# Patient Record
Sex: MALE | Race: WHITE | Employment: OTHER | URBAN - METROPOLITAN AREA
[De-identification: names, ages, dates, MRNs, and addresses within clinical notes are randomized per-mention and may not be internally consistent; named-entity substitution may affect disease eponyms.]

---

## 2019-01-16 ENCOUNTER — HOSPITAL ENCOUNTER (OUTPATIENT)
Dept: CT IMAGING | Age: 74
Discharge: HOME | End: 2019-01-16
Attending: COLON & RECTAL SURGERY
Payer: MEDICARE

## 2019-01-16 DIAGNOSIS — K91.89 ILEAL POUCH-ANAL ANASTOMOTIC LEAK: ICD-10-CM

## 2019-01-16 LAB — CREAT BLD-MCNC: 1.1 MG/DL (ref 0.6–1.3)

## 2019-01-16 PROCEDURE — 74011250636 HC RX REV CODE- 250/636: Performed by: COLON & RECTAL SURGERY

## 2019-01-16 PROCEDURE — 82565 ASSAY OF CREATININE: CPT

## 2019-01-16 PROCEDURE — 74011636320 HC RX REV CODE- 636/320: Performed by: COLON & RECTAL SURGERY

## 2019-01-16 PROCEDURE — 72193 CT PELVIS W/DYE: CPT

## 2019-01-16 RX ORDER — SODIUM CHLORIDE 0.9 % (FLUSH) 0.9 %
10 SYRINGE (ML) INJECTION
Status: COMPLETED | OUTPATIENT
Start: 2019-01-16 | End: 2019-01-16

## 2019-01-16 RX ORDER — SODIUM CHLORIDE 9 MG/ML
50 INJECTION, SOLUTION INTRAVENOUS
Status: COMPLETED | OUTPATIENT
Start: 2019-01-16 | End: 2019-01-16

## 2019-01-16 RX ADMIN — SODIUM CHLORIDE 50 ML/HR: 900 INJECTION, SOLUTION INTRAVENOUS at 07:07

## 2019-01-16 RX ADMIN — Medication 10 ML: at 07:07

## 2019-01-16 RX ADMIN — IOPAMIDOL 100 ML: 755 INJECTION, SOLUTION INTRAVENOUS at 07:07

## 2019-03-25 RX ORDER — CYANOCOBALAMIN (VITAMIN B-12) 1000 MCG
1 TABLET, EXTENDED RELEASE ORAL DAILY
COMMUNITY

## 2019-03-25 RX ORDER — CHOLECALCIFEROL (VITAMIN D3) 125 MCG
1 CAPSULE ORAL DAILY
COMMUNITY

## 2019-03-25 NOTE — PERIOP NOTES
[facility] Ambulatory Surgery Unit Pre-operative Instructions Surgery/Procedure Date  Friday, March 29, 2019            Tentative Arrival Time 6700 1. On the day of your surgery/procedure, please report to the Ambulatory Surgery Unit Registration Desk and sign in at your designated time. The Ambulatory Surgery Unit is located in [facility] on the [facility] side of the [facility] across from the [address] [place] [address]. Please have all of your health insurance cards and a photo ID. 2. You must have someone with you to drive you home, as you should not drive a car for 24 hours following anesthesia. Please make arrangements for a responsible adult friend or family member to stay with you for at least the first 24 hours after your surgery. 3. Do not have anything to eat or drink (including water, gum, mints, coffee, juice) after 11:59 PM, Thursday. This may not apply to medications prescribed by your physician. (Please note below the special instructions with medications to take the morning of surgery, if applicable.) 4. We recommend you do not drink any alcoholic beverages for 24 hours before and after your surgery. 5. Contact your surgeons office for instructions on the following medications: non-steroidal anti-inflammatory drugs (i.e. Advil, Aleve), vitamins, and supplements. (Some surgeons will want you to stop these medications prior to surgery and others may allow you to take them) **If you are currently taking Plavix, Coumadin, Aspirin and/or other blood-thinning agents, contact your surgeon for instructions. ** Your surgeon will partner with the physician prescribing these medications to determine if it is safe to stop or if you need to continue taking. Please do not stop taking these medications without instructions from your surgeon.  
 
6. In an effort to help prevent surgical site infection, we ask that you shower with an anti-bacterial soap (i.e. Dial/Safeguard, or the soap provided to you at your preadmission testing appointment) for 3 days prior to and on the morning of surgery, using a fresh towel after each shower. (Please begin this process with fresh bed linens.) Do not apply any lotions, powders, or deodorants after the shower on the day of your procedure. If applicable, please do not shave the operative site for 48 hours prior to surgery. 7. Wear comfortable clothes. Wear glasses instead of contacts. Do not bring any jewelry or money (other than copays or fees as instructed). Do not wear make-up, particularly mascara, the morning of your surgery. Do not wear nail polish, particularly if you are having foot /hand surgery. Wear your hair loose or down, no ponytails, buns, anup pins or clips. All body piercings must be removed. 8. You should understand that if you do not follow these instructions your surgery may be cancelled. If your physical condition changes (i.e. fever, cold or flu) please contact your surgeon as soon as possible. 9. It is important that you be on time. If a situation occurs where you may be late, or if you have any questions or problems, please call [phone]. 
 
10. Your surgery time may be subject to change. You will receive a phone call the day prior to surgery to confirm your arrival time. 11. Pediatric patients: please bring a change of clothes, diapers, bottle/sippy cup, pacifier, etc. 
 
 
Special Instructions: Take all medications and inhalers, as prescribed, on the morning of surgery with a sip of water. I understand a pre-operative phone call will be made to verify my surgery time. In the event that I am not available, I give permission for a message to be left on my answering service and/or with another person? yes Preop instructions reviewed  Pt verbalized understanding.  
 
 ___________________      ___________________      ________________ (Signature of Patient)          (Witness)                   (Date and Time)

## 2019-03-26 NOTE — ADVANCED PRACTICE NURSE
MASSIMO 5 in PAT phone assessment. Pt reports loud snoring, but denies witnessed apnea while sleeping or ever having been referred for a sleep apnea evaluation. Results faxed to PCP for further recommendations regarding sleep apnea evaluation. Confirmation of fax received.

## 2019-03-28 ENCOUNTER — ANESTHESIA EVENT (OUTPATIENT)
Dept: SURGERY | Age: 74
End: 2019-03-28
Payer: MEDICARE

## 2019-03-28 NOTE — ANESTHESIA PREPROCEDURE EVALUATION
Relevant Problems No relevant active problems Anesthetic History No history of anesthetic complications Review of Systems / Medical History Patient summary reviewed, nursing notes reviewed and pertinent labs reviewed Pulmonary Within defined limits Neuro/Psych Within defined limits Cardiovascular Hypertension CAD and hyperlipidemia Exercise tolerance: >4 METS Comments: ? INF MI  
GI/Hepatic/Renal 
  
 
 
 
 
 
Comments: Ulcerative colitis  Endo/Other Obesity and anemia Other Findings Physical Exam 
 
Airway Mallampati: I Neck ROM: normal range of motion Mouth opening: Normal 
 
 Cardiovascular Regular rate and rhythm,  S1 and S2 normal,  no murmur, click, rub, or gallop Dental 
 
Dentition: Full lower dentures and Full upper dentures Pulmonary Breath sounds clear to auscultation Abdominal 
GI exam deferred Other Findings Anesthetic Plan ASA: 3 Anesthesia type: general 
 
 
 
 
Induction: Intravenous Anesthetic plan and risks discussed with: Patient

## 2019-03-29 ENCOUNTER — HOSPITAL ENCOUNTER (OUTPATIENT)
Age: 74
Setting detail: OUTPATIENT SURGERY
Discharge: HOME | End: 2019-03-29
Attending: COLON & RECTAL SURGERY | Admitting: COLON & RECTAL SURGERY
Payer: MEDICARE

## 2019-03-29 ENCOUNTER — ANESTHESIA (OUTPATIENT)
Dept: SURGERY | Age: 74
End: 2019-03-29
Payer: MEDICARE

## 2019-03-29 VITALS
TEMPERATURE: 97.8 F | RESPIRATION RATE: 18 BRPM | HEART RATE: 70 BPM | HEIGHT: 70 IN | SYSTOLIC BLOOD PRESSURE: 135 MMHG | DIASTOLIC BLOOD PRESSURE: 70 MMHG | WEIGHT: 243.2 LBS | BODY MASS INDEX: 34.82 KG/M2 | OXYGEN SATURATION: 99 %

## 2019-03-29 DIAGNOSIS — R19.09 PRESACRAL MASS: Primary | ICD-10-CM

## 2019-03-29 PROCEDURE — 76210000040 HC AMBSU PH I REC FIRST 0.5 HR: Performed by: COLON & RECTAL SURGERY

## 2019-03-29 PROCEDURE — 77030020255 HC SOL INJ LR 1000ML BG: Performed by: COLON & RECTAL SURGERY

## 2019-03-29 PROCEDURE — 76060000061 HC AMB SURG ANES 0.5 TO 1 HR: Performed by: COLON & RECTAL SURGERY

## 2019-03-29 PROCEDURE — 77030026438 HC STYL ET INTUB CARD -A: Performed by: NURSE ANESTHETIST, CERTIFIED REGISTERED

## 2019-03-29 PROCEDURE — 77030018836 HC SOL IRR NACL ICUM -A: Performed by: COLON & RECTAL SURGERY

## 2019-03-29 PROCEDURE — 87205 SMEAR GRAM STAIN: CPT

## 2019-03-29 PROCEDURE — 74011250636 HC RX REV CODE- 250/636

## 2019-03-29 PROCEDURE — 77030021352 HC CBL LD SYS DISP COVD -B: Performed by: COLON & RECTAL SURGERY

## 2019-03-29 PROCEDURE — 77030008684 HC TU ET CUF COVD -B: Performed by: NURSE ANESTHETIST, CERTIFIED REGISTERED

## 2019-03-29 PROCEDURE — 74011000250 HC RX REV CODE- 250

## 2019-03-29 PROCEDURE — 76210000057 HC AMBSU PH II REC 1 TO 1.5 HR: Performed by: COLON & RECTAL SURGERY

## 2019-03-29 PROCEDURE — 76030000000 HC AMB SURG OR TIME 0.5 TO 1: Performed by: COLON & RECTAL SURGERY

## 2019-03-29 PROCEDURE — 77030011640 HC PAD GRND REM COVD -A: Performed by: COLON & RECTAL SURGERY

## 2019-03-29 PROCEDURE — 74011250636 HC RX REV CODE- 250/636: Performed by: ANESTHESIOLOGY

## 2019-03-29 PROCEDURE — 77030019908 HC STETH ESOPH SIMS -A: Performed by: NURSE ANESTHETIST, CERTIFIED REGISTERED

## 2019-03-29 RX ORDER — TAMSULOSIN HYDROCHLORIDE 0.4 MG/1
0.4 CAPSULE ORAL DAILY
Status: DISCONTINUED | OUTPATIENT
Start: 2019-03-29 | End: 2019-03-29

## 2019-03-29 RX ORDER — FENTANYL CITRATE 50 UG/ML
INJECTION, SOLUTION INTRAMUSCULAR; INTRAVENOUS AS NEEDED
Status: DISCONTINUED | OUTPATIENT
Start: 2019-03-29 | End: 2019-03-29 | Stop reason: HOSPADM

## 2019-03-29 RX ORDER — OXYCODONE AND ACETAMINOPHEN 5; 325 MG/1; MG/1
1 TABLET ORAL
Qty: 18 TAB | Refills: 0 | Status: SHIPPED | OUTPATIENT
Start: 2019-03-29 | End: 2019-04-01

## 2019-03-29 RX ORDER — ONDANSETRON 2 MG/ML
INJECTION INTRAMUSCULAR; INTRAVENOUS AS NEEDED
Status: DISCONTINUED | OUTPATIENT
Start: 2019-03-29 | End: 2019-03-29 | Stop reason: HOSPADM

## 2019-03-29 RX ORDER — MORPHINE SULFATE 10 MG/ML
2 INJECTION, SOLUTION INTRAMUSCULAR; INTRAVENOUS
Status: DISCONTINUED | OUTPATIENT
Start: 2019-03-29 | End: 2019-03-29 | Stop reason: HOSPADM

## 2019-03-29 RX ORDER — ROCURONIUM BROMIDE 10 MG/ML
INJECTION, SOLUTION INTRAVENOUS AS NEEDED
Status: DISCONTINUED | OUTPATIENT
Start: 2019-03-29 | End: 2019-03-29 | Stop reason: HOSPADM

## 2019-03-29 RX ORDER — ONDANSETRON 2 MG/ML
4 INJECTION INTRAMUSCULAR; INTRAVENOUS AS NEEDED
Status: DISCONTINUED | OUTPATIENT
Start: 2019-03-29 | End: 2019-03-29 | Stop reason: HOSPADM

## 2019-03-29 RX ORDER — SUCCINYLCHOLINE CHLORIDE 20 MG/ML INJECTION SOLUTION
SOLUTION AS NEEDED
Status: DISCONTINUED | OUTPATIENT
Start: 2019-03-29 | End: 2019-03-29 | Stop reason: HOSPADM

## 2019-03-29 RX ORDER — FENTANYL CITRATE 50 UG/ML
25 INJECTION, SOLUTION INTRAMUSCULAR; INTRAVENOUS
Status: DISCONTINUED | OUTPATIENT
Start: 2019-03-29 | End: 2019-03-29 | Stop reason: HOSPADM

## 2019-03-29 RX ORDER — FENTANYL CITRATE 50 UG/ML
50 INJECTION, SOLUTION INTRAMUSCULAR; INTRAVENOUS AS NEEDED
Status: DISCONTINUED | OUTPATIENT
Start: 2019-03-29 | End: 2019-03-29 | Stop reason: HOSPADM

## 2019-03-29 RX ORDER — DEXAMETHASONE SODIUM PHOSPHATE 4 MG/ML
INJECTION, SOLUTION INTRA-ARTICULAR; INTRALESIONAL; INTRAMUSCULAR; INTRAVENOUS; SOFT TISSUE AS NEEDED
Status: DISCONTINUED | OUTPATIENT
Start: 2019-03-29 | End: 2019-03-29 | Stop reason: HOSPADM

## 2019-03-29 RX ORDER — TAMSULOSIN HYDROCHLORIDE 0.4 MG/1
0.4 CAPSULE ORAL 2 TIMES DAILY
Status: DISCONTINUED | OUTPATIENT
Start: 2019-03-29 | End: 2019-03-29 | Stop reason: HOSPADM

## 2019-03-29 RX ORDER — PROPOFOL 10 MG/ML
INJECTION, EMULSION INTRAVENOUS AS NEEDED
Status: DISCONTINUED | OUTPATIENT
Start: 2019-03-29 | End: 2019-03-29 | Stop reason: HOSPADM

## 2019-03-29 RX ORDER — LIDOCAINE HYDROCHLORIDE 20 MG/ML
INJECTION, SOLUTION EPIDURAL; INFILTRATION; INTRACAUDAL; PERINEURAL AS NEEDED
Status: DISCONTINUED | OUTPATIENT
Start: 2019-03-29 | End: 2019-03-29 | Stop reason: HOSPADM

## 2019-03-29 RX ORDER — LIDOCAINE HYDROCHLORIDE 10 MG/ML
0.1 INJECTION, SOLUTION EPIDURAL; INFILTRATION; INTRACAUDAL; PERINEURAL AS NEEDED
Status: DISCONTINUED | OUTPATIENT
Start: 2019-03-29 | End: 2019-03-29 | Stop reason: HOSPADM

## 2019-03-29 RX ORDER — DIPHENHYDRAMINE HYDROCHLORIDE 50 MG/ML
12.5 INJECTION, SOLUTION INTRAMUSCULAR; INTRAVENOUS AS NEEDED
Status: DISCONTINUED | OUTPATIENT
Start: 2019-03-29 | End: 2019-03-29 | Stop reason: HOSPADM

## 2019-03-29 RX ORDER — PHENYLEPHRINE HCL IN 0.9% NACL 0.4MG/10ML
SYRINGE (ML) INTRAVENOUS AS NEEDED
Status: DISCONTINUED | OUTPATIENT
Start: 2019-03-29 | End: 2019-03-29 | Stop reason: HOSPADM

## 2019-03-29 RX ORDER — SODIUM CHLORIDE 0.9 % (FLUSH) 0.9 %
5-40 SYRINGE (ML) INJECTION AS NEEDED
Status: DISCONTINUED | OUTPATIENT
Start: 2019-03-29 | End: 2019-03-29 | Stop reason: HOSPADM

## 2019-03-29 RX ORDER — MIDAZOLAM HYDROCHLORIDE 1 MG/ML
0.5 INJECTION, SOLUTION INTRAMUSCULAR; INTRAVENOUS
Status: DISCONTINUED | OUTPATIENT
Start: 2019-03-29 | End: 2019-03-29 | Stop reason: HOSPADM

## 2019-03-29 RX ORDER — TAMSULOSIN HYDROCHLORIDE 0.4 MG/1
0.4 CAPSULE ORAL 2 TIMES DAILY
Qty: 20 CAP | Refills: 0 | Status: SHIPPED | OUTPATIENT
Start: 2019-03-29

## 2019-03-29 RX ORDER — ACETAMINOPHEN 10 MG/ML
INJECTION, SOLUTION INTRAVENOUS AS NEEDED
Status: DISCONTINUED | OUTPATIENT
Start: 2019-03-29 | End: 2019-03-29 | Stop reason: HOSPADM

## 2019-03-29 RX ORDER — AMOXICILLIN AND CLAVULANATE POTASSIUM 875; 125 MG/1; MG/1
1 TABLET, FILM COATED ORAL 2 TIMES DAILY
Qty: 14 TAB | Refills: 0 | Status: SHIPPED | OUTPATIENT
Start: 2019-03-29

## 2019-03-29 RX ORDER — SODIUM CHLORIDE, SODIUM LACTATE, POTASSIUM CHLORIDE, CALCIUM CHLORIDE 600; 310; 30; 20 MG/100ML; MG/100ML; MG/100ML; MG/100ML
25 INJECTION, SOLUTION INTRAVENOUS CONTINUOUS
Status: DISCONTINUED | OUTPATIENT
Start: 2019-03-29 | End: 2019-03-29 | Stop reason: HOSPADM

## 2019-03-29 RX ORDER — HYDROMORPHONE HYDROCHLORIDE 1 MG/ML
.2-.5 INJECTION, SOLUTION INTRAMUSCULAR; INTRAVENOUS; SUBCUTANEOUS
Status: DISCONTINUED | OUTPATIENT
Start: 2019-03-29 | End: 2019-03-29 | Stop reason: HOSPADM

## 2019-03-29 RX ORDER — SODIUM CHLORIDE 0.9 % (FLUSH) 0.9 %
5-40 SYRINGE (ML) INJECTION EVERY 8 HOURS
Status: DISCONTINUED | OUTPATIENT
Start: 2019-03-29 | End: 2019-03-29 | Stop reason: HOSPADM

## 2019-03-29 RX ADMIN — Medication 120 MCG: at 08:32

## 2019-03-29 RX ADMIN — SUCCINYLCHOLINE CHLORIDE 20 MG/ML INJECTION SOLUTION 160 MG: SOLUTION at 08:30

## 2019-03-29 RX ADMIN — Medication 80 MCG: at 09:02

## 2019-03-29 RX ADMIN — SODIUM CHLORIDE, SODIUM LACTATE, POTASSIUM CHLORIDE, AND CALCIUM CHLORIDE 25 ML/HR: 600; 310; 30; 20 INJECTION, SOLUTION INTRAVENOUS at 07:57

## 2019-03-29 RX ADMIN — DEXAMETHASONE SODIUM PHOSPHATE 8 MG: 4 INJECTION, SOLUTION INTRA-ARTICULAR; INTRALESIONAL; INTRAMUSCULAR; INTRAVENOUS; SOFT TISSUE at 08:37

## 2019-03-29 RX ADMIN — Medication 120 MCG: at 08:45

## 2019-03-29 RX ADMIN — LIDOCAINE HYDROCHLORIDE 60 MG: 20 INJECTION, SOLUTION EPIDURAL; INFILTRATION; INTRACAUDAL; PERINEURAL at 08:30

## 2019-03-29 RX ADMIN — FENTANYL CITRATE 50 MCG: 50 INJECTION, SOLUTION INTRAMUSCULAR; INTRAVENOUS at 08:30

## 2019-03-29 RX ADMIN — ROCURONIUM BROMIDE 10 MG: 10 INJECTION, SOLUTION INTRAVENOUS at 08:30

## 2019-03-29 RX ADMIN — PROPOFOL 200 MG: 10 INJECTION, EMULSION INTRAVENOUS at 08:30

## 2019-03-29 RX ADMIN — ACETAMINOPHEN 1000 MG: 10 INJECTION, SOLUTION INTRAVENOUS at 09:00

## 2019-03-29 RX ADMIN — ONDANSETRON 4 MG: 2 INJECTION INTRAMUSCULAR; INTRAVENOUS at 08:37

## 2019-03-29 NOTE — OP NOTES
[address]  OPERATIVE REPORT    Name:  Velvet Swanson  MR#:  [id number]  :  1945  ACCOUNT #:  [de-identified]  DATE OF SERVICE:  2019    PREOPERATIVE DIAGNOSIS:  Presacral fluid mass, which likely drains into the anorectal stump posteriorly. POSTOPERATIVE DIAGNOSIS:  Presacral fluid mass, which likely drains into the anorectal stump posteriorly with definite drainage back into the postanal region with this area opened up today to improve the presacral drainage and prevent accumulation of fluid. PROCEDURE PERFORMED:  Exam under anesthesia and decompression of presacral fluid space back into the posterior edge of the anal cuffs. SURGEON:  Daniele Blank. Vinicio Trinidad MD.    ASSISTANT:  None. ANESTHESIA:  General.    COMPLICATIONS:  None. SPECIMENS REMOVED:  Cultures from the post anal fluid collection space. IMPLANTS:  None. PREOPERATIVE MEDICATIONS:  None. ESTIMATED BLOOD LOSS:  Less than 5 mL. INDICATIONS:  The patient is a 75-year-old gentleman who has undergone a proctocolectomy and has an end ileostomy and a very low anal stump. He has had intermittent discharge through the anal canal and on CT, there is a 5 x 2-cm fluid collection along the presacral area, which appears to drain down to the posterior-superior tip of the anal canal cuff. I suspect there is a very fine fistula into this area and if we can open this up, it will actually improve the drainage and allow the abscess to decompress by gravity back into the cuff. Subsequently, he is brought today to the operating room for exam under anesthesia to see if this is indeed the case. If it is, we will expand the connection simply to allow for a more satisfactory drainage. He is aware of the risks of the procedure including bleeding, anesthesia, infection, and is agreeable to proceed.     PROCEDURE:  After uneventful induction of general anesthetic with the patient in the supine lithotomy position, the patient was sterilely prepped and draped. A Bender small retractor was placed. There appeared to be a small opening at the posterior aspect of the anal cuff and under direct vision, I could pass a tonsil forceps into the area and the tonsil fell into the posterior-superior fluid collection without difficulties. We opened this up with the tonsil to make the opening now more satisfactory and obtained cultures from the area. With the area completely decompressed, the procedure was terminated. He tolerated the procedure well, remained stable, and left in satisfactory condition to return to the recovery and observation area.       Sussy Biggs MD      [id number]  D:  03/29/2019 9:44  T:  03/29/2019 10:25  JOB #:  [id number]  CC:  Kaylen Rubin MD

## 2019-03-29 NOTE — PERIOP NOTES
Permission received to review discharge instructions and discuss private health information with Wife- Haleigh Pastrana. 0917-Wife at bedside, updated on pt's status. VSS. Pt denies pain, illeostomy pouch intact, dressing to buttocks intact. 0932-Pt tolerating juice w/o difficulty. VSS, pt denies pain 1014-D/c instructions reviewed with wife and all questions answered. VSS  Reviewed when to take pain meds, when to call the doctor, sitz baths, and when to call 911. 
 
1034-Transported via w/c to awaiting transportation. No complaints at this time, pt just reporting pressure, no pain.

## 2019-03-29 NOTE — PERIOP NOTES
Shayeeverton Carrasquillo 
1945 
[id number] Situation: 
Verbal report given from: ELVIRA Hawthorne RN Procedure: Procedure(s): RECTAL EXAM UNDER ANESTHESIA (EUA) Background: 
 
Preoperative diagnosis: PRESACRAL FLUID COLLECTION ABOVE ANAL STUMP Postoperative diagnosis: PRESACRAL FLUID COLLECTION ABOVE ANAL STUMP :  Dr. Treasure Harris Assistant(s): Circ-1: Yuridia Norris RN Scrub Tech-1: Hunter Royal 
Scrub Tech-2: Charly Gandara Specimens:  
ID Type Source Tests Collected by Time Destination 1 : PRE SACRAL FLUID COLLECTION Body Fluid Sacral Wound  Jessica Sabillon MD 3/29/2019 9812 Microbiology Assessment: 
Intra-procedure medications Anesthesia gave intra-procedure sedation and medications, see anesthesia flow sheet Intravenous fluids: Dallis Sellar Vital signs stable Recommendation:

## 2019-03-29 NOTE — H&P
[address] HISTORY AND PHYSICAL Name:  Ayala Monterroso 
MR#:  [id number] :  1945 ACCOUNT #:  [de-identified] ADMIT DATE:  2019 CHIEF COMPLAINT:  Presacral fluid collection above the anal stump. HISTORY OF PRESENT ILLNESS:  The patient is a 57-year-old gentleman who in  underwent a proctocolectomy for ulcerative colitis. In , his sphincter control was compromised by sphincter weakening from age and there was no good fix for him at that time and he elected to go forward with excision of the pouch, creation of a permanent end ileostomy and a low anal stump. Subsequently that was done and he has done nicely since that time until recently when he presented to the office with drainage everyday from his anal outlet from early last November up to the present time. This was an anal stump which should not be connected to anything else and should therefore not be draining. He subsequently underwent a CT of the pelvis which shows a 5 x 2 cm fluid collection in the presacral space with the origin of it looking to be just at the posterior aspect of his anal stump. He cannot be examined in the office as he was too uncomfortable and therefore he was brought into the operating room today for an exam under anesthesia and possible drainage of that fluid collection, which I think emanates from the anal stump back into the anal stump to decompress the fluid collection. He is aware of the risks of the procedure including bleeding, perforation and the risk of infection and he is agreeable to proceed. PAST MEDICAL HISTORY:  Significant for colectomy with an ileoanal pouch in  and excision of the pouch in . He has a history of hyperlipidemia and hypertension. MEDICATIONS:  Include pravastatin, losartan, metoprolol, 81 mg of aspirin, multivitamins and vitamin B12. ALLERGIES:  NONE. HABITS:  He drinks socially, does not smoke. FAMILY HISTORY:  Significant for stroke and breast cancer. REVIEW OF SYSTEMS:  Significant for his anal outlet discharge, otherwise his 10-system review is negative. PHYSICAL EXAMINATION: 
GENERAL:  Reveals a 74year-old gentleman, who is 5 feet 10 inches and weighs 230 pounds. ENT:  Clear. NECK:  Supple. LUNGS:  Clear. CARDIAC:  Regular without murmur or failure. ABDOMEN:  Reveals numerous well-healed incisions. He has a ileostomy with a small parastomal hernia. Bowel sounds are active. There is no tenderness. Groins negative. RECTAL:  Cannot be performed due to tenderness on digital exam and will be done under anesthesia today. EXTREMITIES:  No gross deformity. NEUROLOGIC:  Intact. SKIN:  Clear. ASSESSMENT:  Presacral fluid collection above the anal stump. I believe that this presacral fluid collection is the source of his drainage and that he has a small collection above that, that intermittently drains back into the anal stump causing his drainage. PLAN:  We will examine him under anesthesia today. If we can safely drain this presacral collection back into the anal stump, that will ameliorate his issues considerably. He is aware of the risks of surgery including anesthesia, infection and bleeding, and he is agreeable to proceed. We will move forward with the exam under anesthesia today. Tammy Alcazar MD 
 
 
WT/[id number]_T/[id number] 
D:  03/28/2019 20:58 
T:  03/28/2019 22:43 JOB #:  [id number] CC:   Mario Alberto Erickson MD

## 2019-03-29 NOTE — DISCHARGE INSTRUCTIONS
Bob Durant MD, FACS  Chris COLON. Douglas Tomlin MD, FACS  Tabatha Carty. Viky Baum MD, [address] [facility] Solis MD, [address] [facility] Hailey Martinez MD, FACS  Alva Randall. MD Milagros Painting MD    [facility] Discharge Instructions for Ambulatory 23-Hour Surgery Patients    1. Advance to regular diet as tolerated. 2. Remove dressing at any time. 3. Take 1 to 3 sitz baths today (warm water baths for 15-20 minutes). Begin four (4) times a day the day after surgery. 4. Apply Nupercainal or RecticareOintment (does not need a prescription) to the anal area after sitz baths, place a dry 4x4 gauze over the are between the buttocks. 5. Take pain medication as prescribed. (NO DRIVING WHILE ON PAIN MEDICATION). 6. No lifting any objects weighing more than 20 pounds. 7. No sitting more than 45 minutes without standing, walking, or lying down. 8. You may walk as desired. 9.  Please schedule an appointment in the office within 10-14 days. Call ahead to schedule your appointment [phone]. 10.  Call [facility] [phone] if you have any problems or questions after   hours. 11.  Expect slight bright red blood up to four (4) weeks from surgery.      >>>You received an IV form of Tylenol 1000mg (Ofirmev) during your surgery, you may take tylenol (or pain medication containing Tylenol or Acetaminophen) in 4 hours at 1 pm today. <<<      DO NOT TAKE TYLENOL/ACETAMINOPHEN WITH PERCOCET  TAKE NARCOTIC PAIN MEDICATIONS WITH FOOD     Narcotics tend to be constipating, we suggest taking a stool softener such as Colace or Miralax (follow package instructions). DO NOT DRIVE WHILE TAKING NARCOTIC PAIN MEDICATIONS. DO NOT TAKE SLEEPING MEDICATIONS OR ANTIANXIETY MEDICATIONS WHILE TAKING NARCOTIC PAIN MEDICATIONS,  ESPECIALLY THE NIGHT OF ANESTHESIA! CPAP PATIENTS BE SURE TO WEAR MACHINE WHENEVER NAPPING OR SLEEPING!     DISCHARGE SUMMARY from Nurse    The following personal items collected during your admission are returned to you:   Dental Appliance: Dental Appliances: Lowers, Uppers, With patient(pacu)  Vision: Visual Aid: Glasses(given to wife)  Hearing Aid:    Jewelry: Jewelry: Ring, With patient(taped)  Clothing: Clothing: Footwear, Pants, Shirt, Socks, Undergarments, With patient  Other Valuables: Other Valuables: None  Valuables sent to safe:        PATIENT INSTRUCTIONS:    After General Anesthesia or Intravenous Sedation, for 24 hours or while taking prescription Narcotics:        Someone should be with you for the next 24 hours. For your own safety, a responsible adult must drive you home. · Limit your activities  · Recommended activity: Rest today, up with assistance today. Do not climb stairs or shower unattended for the next 24 hours. · Please start with a soft bland diet and advance as tolerated (no nausea) to regular diet. · If you have a sore throat you should try the following: fluids, warm salt water gargles, or throat lozenges. If it does not improve after several days please follow up with your primary physician. · Do not drive and operate hazardous machinery  · Do not make important personal or business decisions  · Do  not drink alcoholic beverages  · If you have not urinated within 8 hours after discharge, please contact your surgeon on call. Report the following to your surgeon:  · Excessive pain, swelling, redness or odor of or around the surgical area  · Temperature over 100.5  · Nausea and vomiting lasting longer than 4 hours or if unable to take medications  · Any signs of decreased circulation or nerve impairment to extremity: change in color, persistent  numbness, tingling, coldness or increase pain      · You will receive a Post Operative Call from one of the Recovery Room Nurses on the day after your surgery to check on you. It is very important for us to know how you are recovering after your surgery.  If you have an issue or need to speak with someone, please call your surgeon, do not wait for the post operative call. · You may receive an e-mail or letter in the mail from [facility] regarding your experience with us in the Ambulatory Surgery Unit. Your feedback is valuable to us and we appreciate your participation in the survey. · If the above instructions are not adequate or you are having problems after your surgery, call the physician at their office number. · We wish you a speedy recovery ? What to do at Home:      *  Please give a list of your current medications to your Primary Care Provider. *  Please update this list whenever your medications are discontinued, doses are      changed, or new medications (including over-the-counter products) are added. *  Please carry medication information at all times in case of emergency situations. These are general instructions for a healthy lifestyle:    No smoking/ No tobacco products/ Avoid exposure to second hand smoke    Surgeon General's Warning:  Quitting smoking now greatly reduces serious risk to your health. Obesity, smoking, and sedentary lifestyle greatly increases your risk for illness    A healthy diet, regular physical exercise & weight monitoring are important for maintaining a healthy lifestyle    You may be retaining fluid if you have a history of heart failure or if you experience any of the following symptoms:  Weight gain of 3 pounds or more overnight or 5 pounds in a week, increased swelling in our hands or feet or shortness of breath while lying flat in bed. Please call your doctor as soon as you notice any of these symptoms; do not wait until your next office visit.     Recognize signs and symptoms of STROKE:    B - Balance  E - Eyes    F-  Face looks uneven  A-  Arms unable to move or move even  S-  Speech slurred or non-existent  T-  Time-call 911 as soon as signs and symptoms begin-DO NOT go       Back to bed or wait to see if you get better-TIME IS BRAIN. If you have not received your influenza and/or pneumococcal vaccine, please follow up with your primary care physician. The discharge information has been reviewed with the patient and caregiver. The patient and caregiver verbalized understanding.

## 2019-03-29 NOTE — PERIOP NOTES
Permission received to review discharge instructions and discuss private health information with wife William Hunter. Patient states that wife William Hunter will be with them for at least 24 hours following today's procedure. Pt. Hooked up to gavi kinney, turned on and given remote.

## 2019-03-29 NOTE — INTERVAL H&P NOTE
H&P Update: 
Laura Smith was seen and examined. History and physical has been reviewed. The patient has been examined.  There have been no significant clinical changes since the completion of the originally dated History and Physical. 
 
Signed By: Torrey Nevarez MD   
 March 29, 2019 8:25 AM

## 2019-03-29 NOTE — ANESTHESIA POSTPROCEDURE EVALUATION
Procedure(s): RECTAL EXAM UNDER ANESTHESIA (EUA). general 
 
Anesthesia Post Evaluation Patient location during evaluation: PACU Note status: Adequate. Level of consciousness: responsive to verbal stimuli and sleepy but conscious Pain management: satisfactory to patient Airway patency: patent Anesthetic complications: no 
Cardiovascular status: acceptable Respiratory status: acceptable Hydration status: acceptable Comments: +Post-Anesthesia Evaluation and Assessment Patient: Kristin Steiner MRN: [id number]  SSN: [id number] YOB: 1945  Age: 74 y.o. Sex: male Cardiovascular Function/Vital Signs /72   Pulse 78   Temp 36.6 °C (97.8 °F)   Resp 16   Ht 5' 10\" (1.778 m)   Wt 110.3 kg (243 lb 3.2 oz)   SpO2 99%   BMI 34.90 kg/m² Patient is status post Procedure(s): RECTAL EXAM UNDER ANESTHESIA (EUA). Nausea/Vomiting: Controlled. Postoperative hydration reviewed and adequate. Pain: 
Pain Scale 1: Numeric (0 - 10) (03/29/19 0940) Pain Intensity 1: 0 (03/29/19 0940) Managed. Neurological Status:  
Neuro (WDL): Within Defined Limits (03/29/19 0940) At baseline. Mental Status and Level of Consciousness: Arousable. Pulmonary Status:  
O2 Device: Room air (03/29/19 0940) Adequate oxygenation and airway patent. Complications related to anesthesia: None Post-anesthesia assessment completed. No concerns. Signed By: Refugio iVnes MD  
 3/29/2019 Post anesthesia nausea and vomiting:  controlled Vitals Value Taken Time /70 3/29/2019  9:25 AM  
Temp 36.6 °C (97.8 °F) 3/29/2019  9:25 AM  
Pulse 80 3/29/2019  9:25 AM  
Resp 25 3/29/2019  9:25 AM  
SpO2 100 % 3/29/2019  9:25 AM

## 2019-03-29 NOTE — BRIEF OP NOTE
BRIEF OPERATIVE NOTE Date of Procedure: 3/29/2019 Preoperative Diagnosis: PRESACRAL FLUID COLLECTION ABOVE ANAL STUMP Postoperative Diagnosis: PRESACRAL FLUID COLLECTION ABOVE ANAL STUMP Procedure(s): RECTAL EXAM UNDER ANESTHESIA (EUA) Surgeon(s) and Role: Di Ji MD - Primary Surgical Assistant: None Surgical Staff: 
Circ-1: Sergey Casillas RN Scrub Tech-1: Jose Pope 
Scrub Tech-2: Earnestine Dakin Event Time In Time Out Incision Start 2573 Incision Close 0901 Anesthesia: General  
Estimated Blood Loss: 3 cc's Specimens:  
ID Type Source Tests Collected by Time Destination 1 : PRE SACRAL FLUID COLLECTION Body Fluid Sacral Wound  Di Ji MD 3/29/2019 7228 Microbiology Findings: presacral collection drains into superior-posterior corner of anus;  Area opened to improve drainage. Complications: none Implants: * No implants in log *

## 2019-04-02 LAB
BACTERIA SPEC CULT: ABNORMAL
GRAM STN SPEC: ABNORMAL
GRAM STN SPEC: ABNORMAL
SERVICE CMNT-IMP: ABNORMAL

## 2022-03-19 PROBLEM — R19.09 PRESACRAL MASS: Status: ACTIVE | Noted: 2019-03-29

## 2022-10-29 ENCOUNTER — APPOINTMENT (OUTPATIENT)
Dept: CT IMAGING | Age: 77
DRG: 390 | End: 2022-10-29
Attending: STUDENT IN AN ORGANIZED HEALTH CARE EDUCATION/TRAINING PROGRAM
Payer: MEDICARE

## 2022-10-29 ENCOUNTER — APPOINTMENT (OUTPATIENT)
Dept: GENERAL RADIOLOGY | Age: 77
DRG: 390 | End: 2022-10-29
Attending: STUDENT IN AN ORGANIZED HEALTH CARE EDUCATION/TRAINING PROGRAM
Payer: MEDICARE

## 2022-10-29 ENCOUNTER — HOSPITAL ENCOUNTER (INPATIENT)
Age: 77
LOS: 5 days | Discharge: HOME | DRG: 390 | End: 2022-11-03
Attending: STUDENT IN AN ORGANIZED HEALTH CARE EDUCATION/TRAINING PROGRAM | Admitting: STUDENT IN AN ORGANIZED HEALTH CARE EDUCATION/TRAINING PROGRAM
Payer: MEDICARE

## 2022-10-29 DIAGNOSIS — D72.829 LEUKOCYTOSIS, UNSPECIFIED TYPE: ICD-10-CM

## 2022-10-29 DIAGNOSIS — K56.50 INTESTINAL ADHESIONS WITH OBSTRUCTION, UNSPECIFIED WHETHER PARTIAL OR COMPLETE (HCC): Primary | ICD-10-CM

## 2022-10-29 LAB
ALBUMIN SERPL-MCNC: 3.7 G/DL (ref 3.5–5)
ALBUMIN/GLOB SERPL: 0.8 {RATIO} (ref 1.1–2.2)
ALP SERPL-CCNC: 80 U/L (ref 45–117)
ALT SERPL-CCNC: 37 U/L (ref 12–78)
ANION GAP SERPL CALC-SCNC: 8 MMOL/L (ref 5–15)
AST SERPL-CCNC: 25 U/L (ref 15–37)
BASOPHILS # BLD: 0 K/UL (ref 0–0.1)
BASOPHILS NFR BLD: 0 % (ref 0–1)
BILIRUB SERPL-MCNC: 0.5 MG/DL (ref 0.2–1)
BUN SERPL-MCNC: 19 MG/DL (ref 6–20)
BUN/CREAT SERPL: 15 (ref 12–20)
CALCIUM SERPL-MCNC: 10.2 MG/DL (ref 8.5–10.1)
CHLORIDE SERPL-SCNC: 108 MMOL/L (ref 97–108)
CO2 SERPL-SCNC: 24 MMOL/L (ref 21–32)
CREAT SERPL-MCNC: 1.27 MG/DL (ref 0.7–1.3)
DIFFERENTIAL METHOD BLD: ABNORMAL
EOSINOPHIL # BLD: 0 K/UL (ref 0–0.4)
EOSINOPHIL NFR BLD: 0 % (ref 0–7)
ERYTHROCYTE [DISTWIDTH] IN BLOOD BY AUTOMATED COUNT: 13.3 % (ref 11.5–14.5)
GLOBULIN SER CALC-MCNC: 4.5 G/DL (ref 2–4)
GLUCOSE SERPL-MCNC: 132 MG/DL (ref 65–100)
HCT VFR BLD AUTO: 49.1 % (ref 36.6–50.3)
HGB BLD-MCNC: 15.4 G/DL (ref 12.1–17)
IMM GRANULOCYTES # BLD AUTO: 0 K/UL (ref 0–0.04)
IMM GRANULOCYTES NFR BLD AUTO: 0 % (ref 0–0.5)
LYMPHOCYTES # BLD: 0.6 K/UL (ref 0.8–3.5)
LYMPHOCYTES NFR BLD: 4 % (ref 12–49)
MCH RBC QN AUTO: 27.4 PG (ref 26–34)
MCHC RBC AUTO-ENTMCNC: 31.4 G/DL (ref 30–36.5)
MCV RBC AUTO: 87.2 FL (ref 80–99)
MONOCYTES # BLD: 1.4 K/UL (ref 0–1)
MONOCYTES NFR BLD: 9 % (ref 5–13)
NEUTS SEG # BLD: 13.6 K/UL (ref 1.8–8)
NEUTS SEG NFR BLD: 87 % (ref 32–75)
NRBC # BLD: 0 K/UL (ref 0–0.01)
NRBC BLD-RTO: 0 PER 100 WBC
PLATELET # BLD AUTO: 301 K/UL (ref 150–400)
PMV BLD AUTO: 9.7 FL (ref 8.9–12.9)
POTASSIUM SERPL-SCNC: 5.3 MMOL/L (ref 3.5–5.1)
PROT SERPL-MCNC: 8.2 G/DL (ref 6.4–8.2)
RBC # BLD AUTO: 5.63 M/UL (ref 4.1–5.7)
RBC MORPH BLD: ABNORMAL
SODIUM SERPL-SCNC: 140 MMOL/L (ref 136–145)
WBC # BLD AUTO: 15.6 K/UL (ref 4.1–11.1)
WBC MORPH BLD: ABNORMAL

## 2022-10-29 PROCEDURE — 74011000636 HC RX REV CODE- 636: Performed by: RADIOLOGY

## 2022-10-29 PROCEDURE — 74011250636 HC RX REV CODE- 250/636: Performed by: STUDENT IN AN ORGANIZED HEALTH CARE EDUCATION/TRAINING PROGRAM

## 2022-10-29 PROCEDURE — 65270000029 HC RM PRIVATE

## 2022-10-29 PROCEDURE — 74177 CT ABD & PELVIS W/CONTRAST: CPT

## 2022-10-29 PROCEDURE — 36415 COLL VENOUS BLD VENIPUNCTURE: CPT

## 2022-10-29 PROCEDURE — 85025 COMPLETE CBC W/AUTO DIFF WBC: CPT

## 2022-10-29 PROCEDURE — 74018 RADEX ABDOMEN 1 VIEW: CPT

## 2022-10-29 PROCEDURE — 96360 HYDRATION IV INFUSION INIT: CPT

## 2022-10-29 PROCEDURE — 80053 COMPREHEN METABOLIC PANEL: CPT

## 2022-10-29 PROCEDURE — 99285 EMERGENCY DEPT VISIT HI MDM: CPT

## 2022-10-29 PROCEDURE — 74011000250 HC RX REV CODE- 250: Performed by: STUDENT IN AN ORGANIZED HEALTH CARE EDUCATION/TRAINING PROGRAM

## 2022-10-29 RX ORDER — HYDRALAZINE HYDROCHLORIDE 20 MG/ML
20 INJECTION INTRAMUSCULAR; INTRAVENOUS
Status: DISCONTINUED | OUTPATIENT
Start: 2022-10-29 | End: 2022-11-03 | Stop reason: HOSPADM

## 2022-10-29 RX ORDER — SODIUM CHLORIDE, SODIUM LACTATE, POTASSIUM CHLORIDE, CALCIUM CHLORIDE 600; 310; 30; 20 MG/100ML; MG/100ML; MG/100ML; MG/100ML
100 INJECTION, SOLUTION INTRAVENOUS CONTINUOUS
Status: DISCONTINUED | OUTPATIENT
Start: 2022-10-29 | End: 2022-11-02

## 2022-10-29 RX ORDER — ENOXAPARIN SODIUM 100 MG/ML
30 INJECTION SUBCUTANEOUS EVERY 12 HOURS
Status: DISCONTINUED | OUTPATIENT
Start: 2022-10-29 | End: 2022-11-03 | Stop reason: HOSPADM

## 2022-10-29 RX ORDER — HYDROMORPHONE HYDROCHLORIDE 1 MG/ML
1 INJECTION, SOLUTION INTRAMUSCULAR; INTRAVENOUS; SUBCUTANEOUS
Status: DISCONTINUED | OUTPATIENT
Start: 2022-10-29 | End: 2022-11-02

## 2022-10-29 RX ORDER — ENOXAPARIN SODIUM 100 MG/ML
40 INJECTION SUBCUTANEOUS EVERY 24 HOURS
Status: DISCONTINUED | OUTPATIENT
Start: 2022-10-29 | End: 2022-10-29

## 2022-10-29 RX ORDER — HYDROMORPHONE HYDROCHLORIDE 1 MG/ML
0.5 INJECTION, SOLUTION INTRAMUSCULAR; INTRAVENOUS; SUBCUTANEOUS
Status: DISCONTINUED | OUTPATIENT
Start: 2022-10-29 | End: 2022-11-02

## 2022-10-29 RX ORDER — METOPROLOL TARTRATE 5 MG/5ML
5 INJECTION INTRAVENOUS EVERY 6 HOURS
Status: DISCONTINUED | OUTPATIENT
Start: 2022-10-29 | End: 2022-11-02

## 2022-10-29 RX ORDER — ONDANSETRON 2 MG/ML
4 INJECTION INTRAMUSCULAR; INTRAVENOUS
Status: DISCONTINUED | OUTPATIENT
Start: 2022-10-29 | End: 2022-11-03 | Stop reason: HOSPADM

## 2022-10-29 RX ADMIN — SODIUM CHLORIDE, POTASSIUM CHLORIDE, SODIUM LACTATE AND CALCIUM CHLORIDE 100 ML/HR: 600; 310; 30; 20 INJECTION, SOLUTION INTRAVENOUS at 17:31

## 2022-10-29 RX ADMIN — SODIUM CHLORIDE 1000 ML: 9 INJECTION, SOLUTION INTRAVENOUS at 14:03

## 2022-10-29 RX ADMIN — ENOXAPARIN SODIUM 30 MG: 100 INJECTION SUBCUTANEOUS at 17:31

## 2022-10-29 RX ADMIN — IOPAMIDOL 100 ML: 755 INJECTION, SOLUTION INTRAVENOUS at 12:44

## 2022-10-29 RX ADMIN — METOPROLOL TARTRATE 5 MG: 5 INJECTION INTRAVENOUS at 17:31

## 2022-10-29 NOTE — ED TRIAGE NOTES
Patient reports he has a blocked RLQ ileostomy and states it is tender to the touch. Last output from ostomy was last night.      Ileostomy is due to entire colon removal in 2003, states his GI doctor has retired and has not seen one in 4 years

## 2022-10-29 NOTE — ED PROVIDER NOTES
HPI     Date of Service:  10/29/2022    Patient:  Garrett Carter    Chief Complaint:  Abdominal Pain       HPI:  Garrett Carter is a 76 y.o.  male with a past medical history of HTN, HLD, CAD, h/o ileostomy in 2003 who presents for evaluation of abdominal pain. Patient notes since last night he has been experiencing diffuse abdominal discomfort and fullness. States his abdomen feels distended. Also endorses that he has been having decreased output into his ileostomy, last output yesterday evening. .  Abdominal pain is worsened with palpation. Nothing improves his symptoms. No nausea or vomiting, but having increased belching. No chest pain. No shortness of breath. No other recent illness.       Past Medical History:   Diagnosis Date    Anemia     At risk for sleep apnea 03/25/2019    per phone assessment for pat on 03/25/2019    CAD (coronary artery disease)     Essential hypertension     History of ileostomy     Hyperlipidemia     Ulcerative colitis ([address]     large colon removed       Past Surgical History:   Procedure Laterality Date    HX COLONOSCOPY      HX CORONARY STENT PLACEMENT  2003    circumflex artery    HX HEART CATHETERIZATION      HX TOTAL COLECTOMY      with ileonal pouch Dr. Favian Campuzano COLON/ILEOSTOMY           Family History:   Problem Relation Age of Onset    Cancer Mother         lung cancer    Stroke Father        Social History     Socioeconomic History    Marital status:      Spouse name: Not on file    Number of children: Not on file    Years of education: Not on file    Highest education level: Not on file   Occupational History    Not on file   Tobacco Use    Smoking status: Former    Smokeless tobacco: Never   Substance and Sexual Activity    Alcohol use: Yes     Comment: occasional basis    Drug use: Never    Sexual activity: Not on file   Other Topics Concern    Not on file   Social History Narrative    Not on file     Social Determinants of Health     Financial Resource Strain: Not on file   Food Insecurity: Not on file   Transportation Needs: Not on file   Physical Activity: Not on file   Stress: Not on file   Social Connections: Not on file   Intimate Partner Violence: Not on file   Housing Stability: Not on file         ALLERGIES: Tape [adhesive]    Review of Systems   Constitutional:  Negative for chills and fever. HENT:  Negative for congestion and rhinorrhea. Eyes:  Negative for discharge and redness. Respiratory:  Negative for cough and shortness of breath. Cardiovascular:  Negative for chest pain. Gastrointestinal:  Positive for abdominal pain and constipation. Negative for nausea and vomiting. Genitourinary:  Positive for frequency. Negative for dysuria and hematuria. Musculoskeletal:  Negative for back pain and neck stiffness. Skin:  Negative for rash and wound. Neurological:  Negative for speech difficulty and headaches. Psychiatric/Behavioral:  Negative for agitation and confusion. Vitals:    10/29/22 1041   BP: 138/69   Pulse: 94   Resp: 18   Temp: 97.7 °F (36.5 °C)   SpO2: 98%   Weight: 108.9 kg (240 lb)   Height: 5' 10\" (1.778 m)            Physical Exam  Vitals and nursing note reviewed. Constitutional:       General: He is not in acute distress. Appearance: Normal appearance. He is obese. He is not ill-appearing or toxic-appearing. HENT:      Head: Normocephalic and atraumatic. Eyes:      Extraocular Movements: Extraocular movements intact. Conjunctiva/sclera: Conjunctivae normal.   Cardiovascular:      Rate and Rhythm: Normal rate. Pulses: Normal pulses. Pulmonary:      Effort: Pulmonary effort is normal. No respiratory distress. Abdominal:      General: Abdomen is protuberant. Palpations: Abdomen is soft. Tenderness: There is generalized abdominal tenderness. There is no guarding or rebound.       Comments: Small amount of stool in ileostomy    Musculoskeletal:         General: Normal range of motion. Skin:     General: Skin is warm and dry. Capillary Refill: Capillary refill takes less than 2 seconds. Neurological:      General: No focal deficit present. Mental Status: He is alert and oriented to person, place, and time. Psychiatric:         Mood and Affect: Mood normal.         Behavior: Behavior normal.        MDM  Number of Diagnoses or Management Options  Intestinal adhesions with obstruction, unspecified whether partial or complete (HCC)  Leukocytosis, unspecified type  Diagnosis management comments:     DECISION MAKING:  Vish Salamanca is a 76 y.o. male who comes in as above. On arrival, patient is afebrile and vital signs are stable. On my examination he is well-appearing, nontoxic. Abdomen is somewhat distended but soft. He has diffuse tenderness without guarding or rebound. He has a right lower quadrant ileostomy that has stool, notes this output was from yesterday. Work-up notable for leukocytosis at 15.6. Electrolytes are stable. BUN and creatinine within normal limits. CT of the abdomen and pelvis does reveal moderate small bowel obstruction with a transition point in the pelvis secondary to likely adhesion as well as right lower quadrant ileostomy with parastomal hernia containing small bowel. Amount and/or Complexity of Data Reviewed  Clinical lab tests: reviewed  Tests in the radiology section of CPT®: reviewed      ED Course as of 10/29/22 1516   Sat Oct 29, 2022   1428 I spoke with general surgery, Dr. Elie Justin, she recommends NG tube placement and will admit the patient. Patient and wife are updated on plan. They are in agreement. [SH]      ED Course User Index  [SH] Jaison Collins DO       NG tube was subsequently placed. KUB is pending.       Procedures      LABS:  Recent Results (from the past 6 hour(s))   CBC WITH AUTOMATED DIFF    Collection Time: 10/29/22 11:09 AM   Result Value Ref Range    WBC 15.6 (H) 4.1 - 11.1 K/uL    RBC 5.63 4.10 - 5.70 M/uL    HGB 15.4 12.1 - 17.0 g/dL    HCT 49.1 36.6 - 50.3 %    MCV 87.2 80.0 - 99.0 FL    MCH 27.4 26.0 - 34.0 PG    MCHC 31.4 30.0 - 36.5 g/dL    RDW 13.3 11.5 - 14.5 %    PLATELET 998 401 - 674 K/uL    MPV 9.7 8.9 - 12.9 FL    NRBC 0.0 0  WBC    ABSOLUTE NRBC 0.00 0.00 - 0.01 K/uL    NEUTROPHILS 87 (H) 32 - 75 %    LYMPHOCYTES 4 (L) 12 - 49 %    MONOCYTES 9 5 - 13 %    EOSINOPHILS 0 0 - 7 %    BASOPHILS 0 0 - 1 %    IMMATURE GRANULOCYTES 0 0.0 - 0.5 %    ABS. NEUTROPHILS 13.6 (H) 1.8 - 8.0 K/UL    ABS. LYMPHOCYTES 0.6 (L) 0.8 - 3.5 K/UL    ABS. MONOCYTES 1.4 (H) 0.0 - 1.0 K/UL    ABS. EOSINOPHILS 0.0 0.0 - 0.4 K/UL    ABS. BASOPHILS 0.0 0.0 - 0.1 K/UL    ABS. IMM. GRANS. 0.0 0.00 - 0.04 K/UL    DF SMEAR SCANNED      RBC COMMENTS NORMOCYTIC, NORMOCHROMIC      WBC COMMENTS VACUOLATED POLYS     METABOLIC PANEL, COMPREHENSIVE    Collection Time: 10/29/22 11:09 AM   Result Value Ref Range    Sodium 140 136 - 145 mmol/L    Potassium 5.3 (H) 3.5 - 5.1 mmol/L    Chloride 108 97 - 108 mmol/L    CO2 24 21 - 32 mmol/L    Anion gap 8 5 - 15 mmol/L    Glucose 132 (H) 65 - 100 mg/dL    BUN 19 6 - 20 MG/DL    Creatinine 1.27 0.70 - 1.30 MG/DL    BUN/Creatinine ratio 15 12 - 20      eGFR 57 (L) >60 ml/min/1.73m2    Calcium 10.2 (H) 8.5 - 10.1 MG/DL    Bilirubin, total 0.5 0.2 - 1.0 MG/DL    ALT (SGPT) 37 12 - 78 U/L    AST (SGOT) 25 15 - 37 U/L    Alk. phosphatase 80 45 - 117 U/L    Protein, total 8.2 6.4 - 8.2 g/dL    Albumin 3.7 3.5 - 5.0 g/dL    Globulin 4.5 (H) 2.0 - 4.0 g/dL    A-G Ratio 0.8 (L) 1.1 - 2.2          IMAGING:  CT ABD PELV W CONT   Final Result      Right lower quadrant ileostomy with parastomal hernia containing small bowel   loops. Moderate small bowel obstruction with transition point in the pelvis   which may be due to adhesion. Large calcified stone is again noted in the bladder. Please refer to above findings for complete details.       XR ABD (KUB)    (Results Pending) Medications During Visit:  Medications   lactated Ringers infusion (has no administration in time range)   HYDROmorphone (DILAUDID) syringe 0.5 mg (has no administration in time range)   HYDROmorphone (DILAUDID) injection 1 mg (has no administration in time range)   ondansetron (ZOFRAN) injection 4 mg (has no administration in time range)   metoprolol (LOPRESSOR) injection 5 mg (has no administration in time range)   hydrALAZINE (APRESOLINE) 20 mg/mL injection 20 mg (has no administration in time range)   enoxaparin (LOVENOX) injection 30 mg (has no administration in time range)   iopamidoL (ISOVUE-370) 76 % injection 100 mL (100 mL IntraVENous Given 10/29/22 1244)   sodium chloride 0.9 % bolus infusion 1,000 mL (0 mL IntraVENous IV Completed 10/29/22 1518)         IMPRESSION:  1.  Intestinal adhesions with obstruction, unspecified whether partial or complete (HCC)    2. Leukocytosis, unspecified type        DISPOSITION:  Admitted        Lynsey Noel DO

## 2022-10-29 NOTE — H&P
General Surgery H&P    Patient is a 76year-old man with history of UC for decades s/p total abdominal proctocolectomy with ileoanal pouch anastomosis in 2003 but failed s/p end ileostomy with 2 prior small bowel obstruction admissions not requiring surgery who presented to the ER with decreased ileostomy output and abdominal discomfort. CT abdomen/pelvis demonstrated multiple dilated loops of small bowel, including within a parastomal hernia, though hernia itself is not the cause of the obstruction, with transition point in the right pelvis with distal collapsed bowel. Patient states yest he noticed some abdominal discomfort, not pain, and decreased gas and succus from the ostomy. He was convinced  by his wife and son to go to the ER in case this was another obstruction before the pain intensified. Denies nausea/vomiting at home but was nauseated in the ER. Last ate early yest.  Wife at bedside. Past Medical History:   Diagnosis Date    Anemia     At risk for sleep apnea 03/25/2019    per phone assessment for pat on 03/25/2019    CAD (coronary artery disease)     Essential hypertension     History of ileostomy 2003    for UC s/p total proctocolectomy    Hyperlipidemia     Ulcerative colitis ([facility] [address]     s/p total proctocolectomy with end ileostomy 2003     Past Surgical History:   Procedure Laterality Date    HX COLONOSCOPY      HX CORONARY STENT PLACEMENT  2003    circumflex artery    HX HEART CATHETERIZATION      HX ILEOSTOMY  2003    failed ileoanal pouch anastomosis    HX OTHER SURGICAL  2003    total proctocolectomy with ileoanal pouch-Dr. Brett Emerson     No current facility-administered medications on file prior to encounter. Current Outpatient Medications on File Prior to Encounter   Medication Sig Dispense Refill    tamsulosin (FLOMAX) 0.4 mg capsule Take 1 Cap by mouth two (2) times a day.  20 Cap 0    amoxicillin-clavulanate (AUGMENTIN) 875-125 mg per tablet Take 1 Tab by mouth two (2) times a day. 14 Tab 0    cholecalciferol, vitamin D3, (VITAMIN D3) 2,000 unit tab Take 1 Tab by mouth daily. iron, carbonyl (FEOSOL) 45 mg tab Take 1 Tab by mouth daily. losartan (COZAAR) 100 mg tablet Take 100 mg by mouth every evening. nitroglycerin (NITROSTAT) 0.4 mg SL tablet 0.4 mg by SubLINGual route every five (5) minutes as needed for Chest Pain.      metoprolol (LOPRESSOR) 50 mg tablet TAKE 1 TABLET BY MOUTH TWICE DAILY 180 Tab 4    aspirin 81 mg tablet Take 81 mg by mouth every evening. MULTIVITAMIN PO Take 1 Tab by mouth daily. pravastatin (PRAVACHOL) 80 mg tablet Take 1 Tab by mouth nightly.  90 Tab 3     Allergies   Allergen Reactions    Tape [Adhesive] Rash     Social History     Socioeconomic History    Marital status:      Spouse name: Not on file    Number of children: Not on file    Years of education: Not on file    Highest education level: Not on file   Occupational History    Not on file   Tobacco Use    Smoking status: Former    Smokeless tobacco: Never   Substance and Sexual Activity    Alcohol use: Yes     Comment: occasional basis    Drug use: Never    Sexual activity: Not on file   Other Topics Concern    Not on file   Social History Narrative    Not on file     Social Determinants of Health     Financial Resource Strain: Not on file   Food Insecurity: Not on file   Transportation Needs: Not on file   Physical Activity: Not on file   Stress: Not on file   Social Connections: Not on file   Intimate Partner Violence: Not on file   Housing Stability: Not on file     ROS  CONSTITUTIONAL: no fevers/chills/weight loss/weight gain/night sweats/jaundice/fatigue  HEENT: no enlarged cervical nodes/dysphagia/odynophagia/reflux  CV: no chest pain/palpitations/skipped beats/irregular beats/  RESP: no shortness of breath/cough/dyspnea on exertion  GI: (+) abdominal discomfort; (+) bloating; no early satiety; (+) resolved nausea/no vomiting; decreased ileostomy output since yest  : no frequency; sometimes difficulty urinating when prostate acts up; no pain on urination/blood in urine  ENDO: no intolerance to cold/intolerance to heat/flushing; was sweating while doing yard work yest when he was in discomfort  EXT: no leg swelling      Patient Vitals for the past 24 hrs:   Temp Pulse Resp BP SpO2   10/29/22 1041 97.7 °F (36.5 °C) 94 18 138/69 98 %     Date 10/28/22 0700 - 10/29/22 0659(Not Admitted) 10/29/22 0700 - 10/30/22 0659   Shift 8452-7754 3458-6794 24 Hour Total 4540-5954 5407-8557 24 Hour Total   INTAKE   I.V.    1000  1000     Volume (sodium chloride 0.9 % bolus infusion 1,000 mL)    1000  1000   Shift Total(mL/kg)    1000(9.2)  1000(9.2)   OUTPUT   Shift Total(mL/kg)         NET    1000  1000   Weight (kg)    108.9 108.9 108.9     GEN: no acute distress  EYES: no scleral icterus  NECK: supple/no cervical lymphadenopathy/no supraclavicular adenopathy  CV: regular rate and rhythm   RESP: clear to auscultation   ABD: soft; (+) diffuse mild tenderness; (+) distension w/ tympany; (+) parastomal hernia w/ bowel reducible; ileostomy pink w/ some succus in the bag  LYMPH: no extremity edema      Recent Results (from the past 24 hour(s))   CBC WITH AUTOMATED DIFF    Collection Time: 10/29/22 11:09 AM   Result Value Ref Range    WBC 15.6 (H) 4.1 - 11.1 K/uL    RBC 5.63 4.10 - 5.70 M/uL    HGB 15.4 12.1 - 17.0 g/dL    HCT 49.1 36.6 - 50.3 %    MCV 87.2 80.0 - 99.0 FL    MCH 27.4 26.0 - 34.0 PG    MCHC 31.4 30.0 - 36.5 g/dL    RDW 13.3 11.5 - 14.5 %    PLATELET 218 095 - 044 K/uL    MPV 9.7 8.9 - 12.9 FL    NRBC 0.0 0  WBC    ABSOLUTE NRBC 0.00 0.00 - 0.01 K/uL    NEUTROPHILS 87 (H) 32 - 75 %    LYMPHOCYTES 4 (L) 12 - 49 %    MONOCYTES 9 5 - 13 %    EOSINOPHILS 0 0 - 7 %    BASOPHILS 0 0 - 1 %    IMMATURE GRANULOCYTES 0 0.0 - 0.5 %    ABS. NEUTROPHILS 13.6 (H) 1.8 - 8.0 K/UL    ABS. LYMPHOCYTES 0.6 (L) 0.8 - 3.5 K/UL    ABS. MONOCYTES 1.4 (H) 0.0 - 1.0 K/UL    ABS.  EOSINOPHILS 0.0 0.0 - 0.4 K/UL    ABS. BASOPHILS 0.0 0.0 - 0.1 K/UL    ABS. IMM. GRANS. 0.0 0.00 - 0.04 K/UL    DF SMEAR SCANNED      RBC COMMENTS NORMOCYTIC, NORMOCHROMIC      WBC COMMENTS VACUOLATED POLYS     METABOLIC PANEL, COMPREHENSIVE    Collection Time: 10/29/22 11:09 AM   Result Value Ref Range    Sodium 140 136 - 145 mmol/L    Potassium 5.3 (H) 3.5 - 5.1 mmol/L    Chloride 108 97 - 108 mmol/L    CO2 24 21 - 32 mmol/L    Anion gap 8 5 - 15 mmol/L    Glucose 132 (H) 65 - 100 mg/dL    BUN 19 6 - 20 MG/DL    Creatinine 1.27 0.70 - 1.30 MG/DL    BUN/Creatinine ratio 15 12 - 20      eGFR 57 (L) >60 ml/min/1.73m2    Calcium 10.2 (H) 8.5 - 10.1 MG/DL    Bilirubin, total 0.5 0.2 - 1.0 MG/DL    ALT (SGPT) 37 12 - 78 U/L    AST (SGOT) 25 15 - 37 U/L    Alk. phosphatase 80 45 - 117 U/L    Protein, total 8.2 6.4 - 8.2 g/dL    Albumin 3.7 3.5 - 5.0 g/dL    Globulin 4.5 (H) 2.0 - 4.0 g/dL    A-G Ratio 0.8 (L) 1.1 - 2.2         CT Results  (Last 48 hours)                 10/29/22 1250  CT ABD PELV W CONT Final result    Impression:      Right lower quadrant ileostomy with parastomal hernia containing small bowel   loops. Moderate small bowel obstruction with transition point in the pelvis   which may be due to adhesion. Large calcified stone is again noted in the bladder. Please refer to above findings for complete details. Narrative:  INDICATION: h/o ileosotmy, increased abd distension and decreased stool output       COMPARISON: 1/16/2009       TECHNIQUE:    Thin axial images were obtained through the abdomen and pelvis following   intravenous iodinated contrast administration. Coronal and sagittal   reconstructions were generated. Oral contrast was not administered. CT dose   reduction was achieved through use of a standardized protocol tailored for this   examination and automatic exposure control for dose modulation.         FINDINGS:        LIVER: Small right hepatic lobe cyst. No biliary ductal dilatation GALLBLADDER: No calcified gallstone   SPLEEN: Unremarkable   PANCREAS: No mass or ductal dilatation. ADRENALS: Unremarkable. KIDNEYS/URETERS: Symmetric nephrograms with mild bilateral renal cortical   atrophy and scarring. No evidence for abnormal enhancing renal mass. No   hydronephrosis    PERITONEUM: No abdominal lymphadenopathy or ascites. COLON: Sequelae of colectomy   APPENDIX: Surgically absent   SMALL BOWEL: Right lower quadrant ileostomy with parastomal hernia containing   dilated loops of small bowel. Transition point in the pelvis (image 73). STOMACH: Mildly distended   PELVIS: No pelvic lymphadenopathy or free fluid. Mildly enlarged prostate gland. Large calcified stone at the left base of the bladder measuring 4 cm. Presacral   soft tissue density may be postsurgical.   BONES: Mild to moderate degenerative changes in spine    VISUALIZED THORAX: No significant abnormality   ADDITIONAL COMMENTS: N/A                   A/P: Patient is a 76year-old man with history of UC for decades s/p total abdominal proctocolectomy with ileoanal pouch anastomosis in 2003 but failed s/p end ileostomy with 2 prior small bowel obstruction admissions now with recurrent small bowel obstruction, likely due to adhesions. -- Admit to Gen Surg. -- Reviewed CT scan images w/ pt and his wife: dilated loops of small bowel, including within hernia; transition point in right pelvis, likely from adhesions. -- NGT for conservative mgmt; pt familiar given his prior episodes. Haylee Dill.  Angelita Jamison MD, FACS

## 2022-10-29 NOTE — PROGRESS NOTES
Primary Nurse Angel Graham and Rj Mercedes, RN performed a dual skin assessment on this patient No impairment noted  Jovany score is 23

## 2022-10-29 NOTE — PROGRESS NOTES
Pharmacist adjusted Lovenox to 30 mg every 12 hours for weight greater than 101 kg and and scr greater than 30 ml/min per P and T approved protocol.     Thank you,      Lico Lozano, PharmD, BCPS

## 2022-10-30 LAB
ANION GAP SERPL CALC-SCNC: 9 MMOL/L (ref 5–15)
BUN SERPL-MCNC: 19 MG/DL (ref 6–20)
BUN/CREAT SERPL: 18 (ref 12–20)
CALCIUM SERPL-MCNC: 8.4 MG/DL (ref 8.5–10.1)
CHLORIDE SERPL-SCNC: 109 MMOL/L (ref 97–108)
CO2 SERPL-SCNC: 24 MMOL/L (ref 21–32)
COMMENT, HOLDF: NORMAL
CREAT SERPL-MCNC: 1.03 MG/DL (ref 0.7–1.3)
GLUCOSE SERPL-MCNC: 133 MG/DL (ref 65–100)
MAGNESIUM SERPL-MCNC: 2.2 MG/DL (ref 1.6–2.4)
PHOSPHATE SERPL-MCNC: 2.8 MG/DL (ref 2.6–4.7)
POTASSIUM SERPL-SCNC: 4.1 MMOL/L (ref 3.5–5.1)
SAMPLES BEING HELD,HOLD: NORMAL
SODIUM SERPL-SCNC: 142 MMOL/L (ref 136–145)

## 2022-10-30 PROCEDURE — 84100 ASSAY OF PHOSPHORUS: CPT

## 2022-10-30 PROCEDURE — 83735 ASSAY OF MAGNESIUM: CPT

## 2022-10-30 PROCEDURE — 80048 BASIC METABOLIC PNL TOTAL CA: CPT

## 2022-10-30 PROCEDURE — 65270000029 HC RM PRIVATE

## 2022-10-30 PROCEDURE — 74011250636 HC RX REV CODE- 250/636: Performed by: STUDENT IN AN ORGANIZED HEALTH CARE EDUCATION/TRAINING PROGRAM

## 2022-10-30 PROCEDURE — 74011000250 HC RX REV CODE- 250: Performed by: STUDENT IN AN ORGANIZED HEALTH CARE EDUCATION/TRAINING PROGRAM

## 2022-10-30 RX ADMIN — METOPROLOL TARTRATE 5 MG: 5 INJECTION INTRAVENOUS at 06:43

## 2022-10-30 RX ADMIN — METOPROLOL TARTRATE 5 MG: 5 INJECTION INTRAVENOUS at 01:25

## 2022-10-30 RX ADMIN — ENOXAPARIN SODIUM 30 MG: 100 INJECTION SUBCUTANEOUS at 18:39

## 2022-10-30 RX ADMIN — METOPROLOL TARTRATE 5 MG: 5 INJECTION INTRAVENOUS at 13:25

## 2022-10-30 RX ADMIN — METOPROLOL TARTRATE 5 MG: 5 INJECTION INTRAVENOUS at 18:39

## 2022-10-30 RX ADMIN — ENOXAPARIN SODIUM 30 MG: 100 INJECTION SUBCUTANEOUS at 06:43

## 2022-10-30 RX ADMIN — SODIUM CHLORIDE, POTASSIUM CHLORIDE, SODIUM LACTATE AND CALCIUM CHLORIDE 100 ML/HR: 600; 310; 30; 20 INJECTION, SOLUTION INTRAVENOUS at 18:40

## 2022-10-30 RX ADMIN — SODIUM CHLORIDE, POTASSIUM CHLORIDE, SODIUM LACTATE AND CALCIUM CHLORIDE 100 ML/HR: 600; 310; 30; 20 INJECTION, SOLUTION INTRAVENOUS at 04:16

## 2022-10-30 NOTE — PROGRESS NOTES
Medicare pt has received, reviewed, and signed  IM letter informing them of their right to appeal the discharge. Signed copy has been placed on pt bedside chart.   Cheryle Kearns CMS

## 2022-10-30 NOTE — PROGRESS NOTES
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 CT abdomen/pelvis demonstrated multiple dilated loops of small bowel, including within a parastomal hernia, though hernia itself is not the cause of the obstruction, with transition point in the right pelvis with distal collapsed bowel. Patient states abdominal discomfort is largely unchanged but he has been emptying his ileostomy bag many times overnight. Still feels bloated. Denies nausea/vomiting. No family currently at bedside. No current facility-administered medications on file prior to encounter. Current Outpatient Medications on File Prior to Encounter   Medication Sig Dispense Refill    tamsulosin (FLOMAX) 0.4 mg capsule Take 1 Cap by mouth two (2) times a day. 20 Cap 0    amoxicillin-clavulanate (AUGMENTIN) 875-125 mg per tablet Take 1 Tab by mouth two (2) times a day. 14 Tab 0    cholecalciferol, vitamin D3, (VITAMIN D3) 2,000 unit tab Take 1 Tab by mouth daily. iron, carbonyl (FEOSOL) 45 mg tab Take 1 Tab by mouth daily. losartan (COZAAR) 100 mg tablet Take 100 mg by mouth every evening. nitroglycerin (NITROSTAT) 0.4 mg SL tablet 0.4 mg by SubLINGual route every five (5) minutes as needed for Chest Pain.      metoprolol (LOPRESSOR) 50 mg tablet TAKE 1 TABLET BY MOUTH TWICE DAILY 180 Tab 4    aspirin 81 mg tablet Take 81 mg by mouth every evening. MULTIVITAMIN PO Take 1 Tab by mouth daily. pravastatin (PRAVACHOL) 80 mg tablet Take 1 Tab by mouth nightly.  90 Tab 3       Patient Vitals for the past 24 hrs:   Temp Pulse Resp BP SpO2   10/30/22 0859 98.3 °F (36.8 °C) 82 18 125/74 95 %   10/30/22 0530 98.3 °F (36.8 °C) 84 18 137/80 95 % 10/30/22 0120 99.1 °F (37.3 °C) 97 18 (!) 149/80 95 %   10/29/22 2039 98.3 °F (36.8 °C) 87 18 138/89 98 %   10/29/22 1741 -- -- -- -- 97 %   10/29/22 1715 98.1 °F (36.7 °C) 96 18 (!) 157/87 97 %   10/29/22 1041 97.7 °F (36.5 °C) 94 18 138/69 98 %     Date 10/29/22 0700 - 10/30/22 0659 10/30/22 0700 - 10/31/22 0659   Shift 4869-4647 6878-4077 24 Hour Total 6229-4805 9873-3187 24 Hour Total   INTAKE   I.V.(mL/kg/hr) 1000(0.8)  1000(0.4)        Volume (sodium chloride 0.9 % bolus infusion 1,000 mL) 1000  1000      Shift Total(mL/kg) 1000(9.2)  1000(9.2)      OUTPUT   Urine(mL/kg/hr)  400(0.3) 400(0.2)        Urine Voided  400 400      Emesis/NG output  0 0        Output (ml) (Nasogastric Tube 10/29/22)  0 0      Stool           Stool Occurrence(s)  4 x 4 x      Shift Total(mL/kg)  400(3.7) 400(3.7)      NET 1000 -400 600      Weight (kg) 108.9 108.9 108.9 108.9 108.9 108.9     GEN: no acute distress  ABD: soft; (+) diffuse mild tenderness; a little less distension w/ less tympany; (+) parastomal hernia w/ bowel reducible; ileostomy bag full      Recent Results (from the past 24 hour(s))   CBC WITH AUTOMATED DIFF    Collection Time: 10/29/22 11:09 AM   Result Value Ref Range    WBC 15.6 (H) 4.1 - 11.1 K/uL    RBC 5.63 4.10 - 5.70 M/uL    HGB 15.4 12.1 - 17.0 g/dL    HCT 49.1 36.6 - 50.3 %    MCV 87.2 80.0 - 99.0 FL    MCH 27.4 26.0 - 34.0 PG    MCHC 31.4 30.0 - 36.5 g/dL    RDW 13.3 11.5 - 14.5 %    PLATELET 640 257 - 167 K/uL    MPV 9.7 8.9 - 12.9 FL    NRBC 0.0 0  WBC    ABSOLUTE NRBC 0.00 0.00 - 0.01 K/uL    NEUTROPHILS 87 (H) 32 - 75 %    LYMPHOCYTES 4 (L) 12 - 49 %    MONOCYTES 9 5 - 13 %    EOSINOPHILS 0 0 - 7 %    BASOPHILS 0 0 - 1 %    IMMATURE GRANULOCYTES 0 0.0 - 0.5 %    ABS. NEUTROPHILS 13.6 (H) 1.8 - 8.0 K/UL    ABS. LYMPHOCYTES 0.6 (L) 0.8 - 3.5 K/UL    ABS. MONOCYTES 1.4 (H) 0.0 - 1.0 K/UL    ABS. EOSINOPHILS 0.0 0.0 - 0.4 K/UL    ABS. BASOPHILS 0.0 0.0 - 0.1 K/UL    ABS. IMM.  GRANS. 0.0 0.00 - 0.04 K/UL    DF SMEAR SCANNED      RBC COMMENTS NORMOCYTIC, NORMOCHROMIC      WBC COMMENTS VACUOLATED POLYS     METABOLIC PANEL, COMPREHENSIVE    Collection Time: 10/29/22 11:09 AM   Result Value Ref Range    Sodium 140 136 - 145 mmol/L    Potassium 5.3 (H) 3.5 - 5.1 mmol/L    Chloride 108 97 - 108 mmol/L    CO2 24 21 - 32 mmol/L    Anion gap 8 5 - 15 mmol/L    Glucose 132 (H) 65 - 100 mg/dL    BUN 19 6 - 20 MG/DL    Creatinine 1.27 0.70 - 1.30 MG/DL    BUN/Creatinine ratio 15 12 - 20      eGFR 57 (L) >60 ml/min/1.73m2    Calcium 10.2 (H) 8.5 - 10.1 MG/DL    Bilirubin, total 0.5 0.2 - 1.0 MG/DL    ALT (SGPT) 37 12 - 78 U/L    AST (SGOT) 25 15 - 37 U/L    Alk. phosphatase 80 45 - 117 U/L    Protein, total 8.2 6.4 - 8.2 g/dL    Albumin 3.7 3.5 - 5.0 g/dL    Globulin 4.5 (H) 2.0 - 4.0 g/dL    A-G Ratio 0.8 (L) 1.1 - 2.2     METABOLIC PANEL, BASIC    Collection Time: 10/30/22  1:30 AM   Result Value Ref Range    Sodium 142 136 - 145 mmol/L    Potassium 4.1 3.5 - 5.1 mmol/L    Chloride 109 (H) 97 - 108 mmol/L    CO2 24 21 - 32 mmol/L    Anion gap 9 5 - 15 mmol/L    Glucose 133 (H) 65 - 100 mg/dL    BUN 19 6 - 20 MG/DL    Creatinine 1.03 0.70 - 1.30 MG/DL    BUN/Creatinine ratio 18 12 - 20      eGFR >60 >60 ml/min/1.73m2    Calcium 8.4 (L) 8.5 - 10.1 MG/DL   MAGNESIUM    Collection Time: 10/30/22  1:30 AM   Result Value Ref Range    Magnesium 2.2 1.6 - 2.4 mg/dL   PHOSPHORUS    Collection Time: 10/30/22  1:30 AM   Result Value Ref Range    Phosphorus 2.8 2.6 - 4.7 MG/DL   SAMPLES BEING HELD    Collection Time: 10/30/22  1:30 AM   Result Value Ref Range    SAMPLES BEING HELD 1LAV     COMMENT        Add-on orders for these samples will be processed based on acceptable specimen integrity and analyte stability, which may vary by analyte.        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 -- NGT with 0 output and appears functional.  NGT clamp trial.  If output is low, will d/c NGT. -- Ambulate. Yaw Hardin.  Gabriel Henson MD, FACS

## 2022-10-31 LAB
ANION GAP SERPL CALC-SCNC: 8 MMOL/L (ref 5–15)
BUN SERPL-MCNC: 16 MG/DL (ref 6–20)
BUN/CREAT SERPL: 16 (ref 12–20)
CALCIUM SERPL-MCNC: 8.3 MG/DL (ref 8.5–10.1)
CHLORIDE SERPL-SCNC: 108 MMOL/L (ref 97–108)
CO2 SERPL-SCNC: 26 MMOL/L (ref 21–32)
CREAT SERPL-MCNC: 1 MG/DL (ref 0.7–1.3)
GLUCOSE SERPL-MCNC: 95 MG/DL (ref 65–100)
MAGNESIUM SERPL-MCNC: 2 MG/DL (ref 1.6–2.4)
PHOSPHATE SERPL-MCNC: 2.6 MG/DL (ref 2.6–4.7)
POTASSIUM SERPL-SCNC: 3.6 MMOL/L (ref 3.5–5.1)
SODIUM SERPL-SCNC: 142 MMOL/L (ref 136–145)

## 2022-10-31 PROCEDURE — 84100 ASSAY OF PHOSPHORUS: CPT

## 2022-10-31 PROCEDURE — 36415 COLL VENOUS BLD VENIPUNCTURE: CPT

## 2022-10-31 PROCEDURE — 65270000029 HC RM PRIVATE

## 2022-10-31 PROCEDURE — 74011250636 HC RX REV CODE- 250/636: Performed by: STUDENT IN AN ORGANIZED HEALTH CARE EDUCATION/TRAINING PROGRAM

## 2022-10-31 PROCEDURE — 83735 ASSAY OF MAGNESIUM: CPT

## 2022-10-31 PROCEDURE — 74011000250 HC RX REV CODE- 250: Performed by: STUDENT IN AN ORGANIZED HEALTH CARE EDUCATION/TRAINING PROGRAM

## 2022-10-31 PROCEDURE — 80048 BASIC METABOLIC PNL TOTAL CA: CPT

## 2022-10-31 RX ADMIN — METOPROLOL TARTRATE 5 MG: 5 INJECTION INTRAVENOUS at 13:51

## 2022-10-31 RX ADMIN — METOPROLOL TARTRATE 5 MG: 5 INJECTION INTRAVENOUS at 00:33

## 2022-10-31 RX ADMIN — METOPROLOL TARTRATE 5 MG: 5 INJECTION INTRAVENOUS at 21:49

## 2022-10-31 RX ADMIN — ENOXAPARIN SODIUM 30 MG: 100 INJECTION SUBCUTANEOUS at 06:40

## 2022-10-31 RX ADMIN — SODIUM CHLORIDE, POTASSIUM CHLORIDE, SODIUM LACTATE AND CALCIUM CHLORIDE 100 ML/HR: 600; 310; 30; 20 INJECTION, SOLUTION INTRAVENOUS at 00:29

## 2022-10-31 RX ADMIN — ENOXAPARIN SODIUM 30 MG: 100 INJECTION SUBCUTANEOUS at 18:27

## 2022-10-31 RX ADMIN — METOPROLOL TARTRATE 5 MG: 5 INJECTION INTRAVENOUS at 06:40

## 2022-10-31 RX ADMIN — SODIUM CHLORIDE, POTASSIUM CHLORIDE, SODIUM LACTATE AND CALCIUM CHLORIDE 100 ML/HR: 600; 310; 30; 20 INJECTION, SOLUTION INTRAVENOUS at 10:41

## 2022-10-31 RX ADMIN — SODIUM CHLORIDE, POTASSIUM CHLORIDE, SODIUM LACTATE AND CALCIUM CHLORIDE 100 ML/HR: 600; 310; 30; 20 INJECTION, SOLUTION INTRAVENOUS at 21:49

## 2022-10-31 NOTE — PROGRESS NOTES
Pt resting in bed, chest rising and falling, eyes closed. In NAD at this time. Door slightly open. IV infusing.

## 2022-10-31 NOTE — PROGRESS NOTES
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 CT abdomen/pelvis demonstrated multiple dilated loops of small bowel, including within a parastomal hernia, though hernia itself is not the cause of the obstruction, with transition point in the right pelvis with distal collapsed bowel. Patient states abdominal discomfort is largely unchanged, though he feels better overall since the NGT was removed last night; the ostomy output has started to slow but is picking up again. Still feels a little bloated. Denies nausea/vomiting. Ambulating. Wife at bedside. No current facility-administered medications on file prior to encounter. Current Outpatient Medications on File Prior to Encounter   Medication Sig Dispense Refill    tamsulosin (FLOMAX) 0.4 mg capsule Take 1 Cap by mouth two (2) times a day. 20 Cap 0    cholecalciferol, vitamin D3, 50 mcg (2,000 unit) tab Take 1 Tab by mouth daily. iron, carbonyl (FEOSOL) 45 mg tab Take 1 Tab by mouth daily. losartan (COZAAR) 100 mg tablet Take 100 mg by mouth every evening. metoprolol (LOPRESSOR) 50 mg tablet TAKE 1 TABLET BY MOUTH TWICE DAILY 180 Tab 4    aspirin 81 mg tablet Take 81 mg by mouth every evening. MULTIVITAMIN PO Take 1 Tab by mouth daily. pravastatin (PRAVACHOL) 80 mg tablet Take 1 Tab by mouth nightly. 90 Tab 3    amoxicillin-clavulanate (AUGMENTIN) 875-125 mg per tablet Take 1 Tab by mouth two (2) times a day. (Patient not taking: Reported on 10/30/2022) 14 Tab 0    nitroglycerin (NITROSTAT) 0.4 mg SL tablet 0.4 mg by SubLINGual route every five (5) minutes as needed for Chest Pain.  (Patient not taking: Reported on 10/30/2022)         Patient Vitals for the past 24 hrs:   Temp Pulse Resp BP SpO2   10/31/22 1351 -- 93 -- 115/76 --   10/31/22 1349 -- -- -- 115/76 --   10/31/22 1148 97.9 °F (36.6 °C) 69 20 128/69 96 %   10/31/22 0813 97.9 °F (36.6 °C) 73 19 131/76 97 %   10/31/22 0329 97.5 °F (36.4 °C) 76 19 (!) 157/74 95 %   10/31/22 0032 98.4 °F (36.9 °C) 84 19 126/75 96 %   10/30/22 1945 98.1 °F (36.7 °C) 81 19 106/62 97 %   10/30/22 1840 -- -- -- (!) 149/106 --     Date 10/30/22 0700 - 10/31/22 0659 10/31/22 0700 - 11/01/22 0659   Shift 5636-1292 0283-5205 24 Hour Total 9247-3284 2959-5632 24 Hour Total   INTAKE   P.O.  0 0 0  0     P. O.  0 0 0  0   I. V.(mL/kg/hr)  3473.3(2.7) 3473.3(1.3)        Volume (lactated Ringers infusion)  3473.3 3473.3      Shift Total(mL/kg)  3473. 3(31.9) 3473. 3(31.9) 0(0)  0(0)   OUTPUT   Urine(mL/kg/hr)           Urine Occurrence(s)  1 x 1 x 1 x  1 x   Stool           Stool Occurrence(s)  0 x 0 x 1 x  1 x   Shift Total(mL/kg)         NET  3473.3 3473.3 0  0   Weight (kg) 108.9 108.9 108.9 108.9 108.9 108.9     GEN: no acute distress  ABD: soft; (+) mild-mod tenderness over parastomal hernia; a little less distension w/ less tympany; (+) parastomal hernia w/ bowel reducible; ileostomy bag with a little contents      Recent Results (from the past 24 hour(s))   METABOLIC PANEL, BASIC    Collection Time: 10/31/22  2:37 AM   Result Value Ref Range    Sodium 142 136 - 145 mmol/L    Potassium 3.6 3.5 - 5.1 mmol/L    Chloride 108 97 - 108 mmol/L    CO2 26 21 - 32 mmol/L    Anion gap 8 5 - 15 mmol/L    Glucose 95 65 - 100 mg/dL    BUN 16 6 - 20 MG/DL    Creatinine 1.00 0.70 - 1.30 MG/DL    BUN/Creatinine ratio 16 12 - 20      eGFR >60 >60 ml/min/1.73m2    Calcium 8.3 (L) 8.5 - 10.1 MG/DL   MAGNESIUM    Collection Time: 10/31/22  2:37 AM   Result Value Ref Range    Magnesium 2.0 1.6 - 2.4 mg/dL   PHOSPHORUS    Collection Time: 10/31/22  2:37 AM   Result Value Ref Range    Phosphorus 2.6 2.6 - 4.7 MG/DL       A/P: Patient is a 76 year-old man with history of UC for decades 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  -- Will hold off on clears today given the questionable output of the ileostomy and continued abd discomfort and mild bloating.  -- Ambulate. Violet Schmidt.  Nahed Vale MD, FACS

## 2022-10-31 NOTE — PROGRESS NOTES
Bedside shift change report given to Beth (oncoming nurse) by Brian Harden (offgoing nurse). Report included the following information SBAR, Kardex, Intake/Output, MAR, and Recent Results.

## 2022-10-31 NOTE — PROGRESS NOTES
10/31/2022 1:20 PM   Care Management Assessment      Reason for Admission: Emergency -       ICD-10-CM ICD-9-CM    1. Intestinal adhesions with obstruction, unspecified whether partial or complete (HCC)  K56.50 560.81       2. Leukocytosis, unspecified type  D72.829 288.60           Assessment:   [x]In person with pt and pt's wife  Charted address and phone numbers confirmed. RUR: 6%  Risk Level: [x]Low []Moderate []High  Value-based purchasing: [] Yes [x] No    Advance Directive: Prior. [x] No AD on file. [] AD on file. [] Current AD not on file. Copy requested. [] Requests AD, and referral submitted to [facility]. Healthcare Decision Maker:         Assessment:    Age: 76 y.o. Sex: [x] Male []Female     Residency: [x]Private residence []Apartment []Assisted Living []LTC []Other:   Exterior Steps: 4  Interior Steps: 0    Lives With: [x]With spouse []Other family members []Underage children []Alone []Care provider []Other:    Prior functioning:  [x]Independent with ADLs and iADLS []Dependent with ADLs and iADLs []Partial dependence, Specify:     Cognition: [x]Intact []Some spheres some of the time. []Some spheres all of the time. []All spheres all of the time. Prior transportation method: [x]Self []Spouse []Other family members []Medicaid transport []Other:     Prior DME required:  [x]None []RW []Cane []Crutches []Bedside commode []CPAP []Home O2 (Liter/Provider: ) []Nebulizer   []Shower Chair []Wheelchair []Hospital Bed []Kimberly []Stair lift []Rollator []Other:    Rehab history: [x]None []Outpatient PT []Home Health (Provider/Date: ) []SNF (Provider/Date: ) []IPR (Provider/Date: ) []LTC (Provider/Date: ) []Hospice (Provider/Date: )  []Other:     Insurer:   Insurance Information                  [facility] [address] Phone: [phone]    Subscriber: Pablito Ojeda.  Subscriber#: [id number]    Group#: -- Precert#: --        BLUE CROSS/[facility] BLUE [facility] RETIRED Phone: --    Subscriber: Mar Olga Lidias. Subscriber#: [id number]    Group#: [id number] Precert#: --            PCP: Marcelino Razo   Address: [address] [address]   Phone number: [phone]   Current patient: [x]Yes []No   Approximate date of last visit: 1 month ago   Access to virtual PCP visits: []Yes []No       Pharmacy:  [address] [place] [address] Transport: Pt's wife          Transition of care plan:    [x] Home with family assistance as needed, and outpatient follow-up. CM will follow. ISMAEL Morales  Care Management Interventions  PCP Verified by CM:  Yes (Shanell Flores Signup: No  Discharge Durable Medical Equipment: No  Physical Therapy Consult: No  Occupational Therapy Consult: No  Speech Therapy Consult: No  Support Systems: Spouse/Significant Other  Discharge Location  Patient Expects to be Discharged to[de-identified] Home with family assistance

## 2022-10-31 NOTE — PROGRESS NOTES
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 CT abdomen/pelvis demonstrated multiple dilated loops of small bowel, including within a parastomal hernia, though hernia itself is not the cause of the obstruction, with transition point in the right pelvis with distal collapsed bowel. Patient states abdominal pain has improved, but bloating feeling may be a bit worse. No n/v. Has still been emptying ileostomy, but thinks volume is reduced. No actual volume recorded for comparison. Has not yet been out of bed ambulating yet, except to bathroom. No family currently at bedside. No current facility-administered medications on file prior to encounter. Current Outpatient Medications on File Prior to Encounter   Medication Sig Dispense Refill    tamsulosin (FLOMAX) 0.4 mg capsule Take 1 Cap by mouth two (2) times a day. 20 Cap 0    cholecalciferol, vitamin D3, 50 mcg (2,000 unit) tab Take 1 Tab by mouth daily. iron, carbonyl (FEOSOL) 45 mg tab Take 1 Tab by mouth daily. losartan (COZAAR) 100 mg tablet Take 100 mg by mouth every evening. metoprolol (LOPRESSOR) 50 mg tablet TAKE 1 TABLET BY MOUTH TWICE DAILY 180 Tab 4    aspirin 81 mg tablet Take 81 mg by mouth every evening. MULTIVITAMIN PO Take 1 Tab by mouth daily. pravastatin (PRAVACHOL) 80 mg tablet Take 1 Tab by mouth nightly. 90 Tab 3    amoxicillin-clavulanate (AUGMENTIN) 875-125 mg per tablet Take 1 Tab by mouth two (2) times a day. (Patient not taking: Reported on 10/30/2022) 14 Tab 0    nitroglycerin (NITROSTAT) 0.4 mg SL tablet 0.4 mg by SubLINGual route every five (5) minutes as needed for Chest Pain.  (Patient not taking: Reported on 10/30/2022)         Patient Vitals for the past 24 hrs:   Temp Pulse Resp BP SpO2   10/31/22 0329 97.5 °F (36.4 °C) 76 19 (!) 157/74 95 %   10/31/22 0032 98.4 °F (36.9 °C) 84 19 126/75 96 %   10/30/22 1945 98.1 °F (36.7 °C) 81 19 106/62 97 %   10/30/22 1840 -- -- -- (!) 149/106 --   10/30/22 1507 98.3 °F (36.8 °C) 84 19 120/80 96 %   10/30/22 1441 98.3 °F (36.8 °C) 86 19 116/80 96 %   10/30/22 1140 97.7 °F (36.5 °C) 84 19 132/71 95 %   10/30/22 0859 98.3 °F (36.8 °C) 82 18 125/74 95 %   10/30/22 0820 -- -- -- -- 95 %       Date 10/30/22 0700 - 10/31/22 0659 10/31/22 0700 - 11/01/22 0659   Shift 0170-9730 2247-1738 24 Hour Total 8811-1492 4542-3485 24 Hour Total   INTAKE   P.O.  0 0        P. O.  0 0      I. V.(mL/kg/hr)  3473.3(2.7) 3473.3(1.3)        Volume (lactated Ringers infusion)  3473.3 3473.3      Shift Total(mL/kg)  3473. 3(31.9) 3473. 3(31.9)      OUTPUT   Urine(mL/kg/hr)           Urine Occurrence(s)  1 x 1 x      Stool           Stool Occurrence(s)  0 x 0 x      Shift Total(mL/kg)         NET  3473.3 3473.3      Weight (kg) 108.9 108.9 108.9 108.9 108.9 108.9       GEN: no acute distress  ABD: soft; (+) improving mild tenderness mostly near ileostomy site; distended; (+) parastomal hernia w/ bowel reducible; ileostomy bag with liquid output      Recent Results (from the past 24 hour(s))   METABOLIC PANEL, BASIC    Collection Time: 10/31/22  2:37 AM   Result Value Ref Range    Sodium 142 136 - 145 mmol/L    Potassium 3.6 3.5 - 5.1 mmol/L    Chloride 108 97 - 108 mmol/L    CO2 26 21 - 32 mmol/L    Anion gap 8 5 - 15 mmol/L    Glucose 95 65 - 100 mg/dL    BUN 16 6 - 20 MG/DL    Creatinine 1.00 0.70 - 1.30 MG/DL    BUN/Creatinine ratio 16 12 - 20      eGFR >60 >60 ml/min/1.73m2    Calcium 8.3 (L) 8.5 - 10.1 MG/DL   MAGNESIUM    Collection Time: 10/31/22  2:37 AM   Result Value Ref Range    Magnesium 2.0 1.6 - 2.4 mg/dL   PHOSPHORUS    Collection Time: 10/31/22  2:37 AM   Result Value Ref Range    Phosphorus 2.6 2.6 - 4.7 MG/DL       A/P: Patient is a 76year-old man with history of UC for decades 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  -- Sips of water with meds and black coffee, will hold off on any more advancement bc of pt feeling of distention  -- Pt to record ileostomy output on board so we can evaluate how much is actually out vs in  -- IVF  -- Ambulate, educated pt about importance of ambulation in the setting of an obstruction      Pt discussed with Dr Andrea Capellan. Her assessment and note to follow. Vaishnavi Arteaga, RACHEL Ragland.  Andrea Capellan MD, FACS

## 2022-11-01 LAB
ANION GAP SERPL CALC-SCNC: 7 MMOL/L (ref 5–15)
BUN SERPL-MCNC: 16 MG/DL (ref 6–20)
BUN/CREAT SERPL: 16 (ref 12–20)
CALCIUM SERPL-MCNC: 8.8 MG/DL (ref 8.5–10.1)
CHLORIDE SERPL-SCNC: 107 MMOL/L (ref 97–108)
CO2 SERPL-SCNC: 27 MMOL/L (ref 21–32)
CREAT SERPL-MCNC: 1.02 MG/DL (ref 0.7–1.3)
GLUCOSE SERPL-MCNC: 84 MG/DL (ref 65–100)
MAGNESIUM SERPL-MCNC: 2.2 MG/DL (ref 1.6–2.4)
PHOSPHATE SERPL-MCNC: 2.8 MG/DL (ref 2.6–4.7)
POTASSIUM SERPL-SCNC: 3.7 MMOL/L (ref 3.5–5.1)
SODIUM SERPL-SCNC: 141 MMOL/L (ref 136–145)

## 2022-11-01 PROCEDURE — 80048 BASIC METABOLIC PNL TOTAL CA: CPT

## 2022-11-01 PROCEDURE — 83735 ASSAY OF MAGNESIUM: CPT

## 2022-11-01 PROCEDURE — 84100 ASSAY OF PHOSPHORUS: CPT

## 2022-11-01 PROCEDURE — 36415 COLL VENOUS BLD VENIPUNCTURE: CPT

## 2022-11-01 PROCEDURE — 74011250636 HC RX REV CODE- 250/636: Performed by: STUDENT IN AN ORGANIZED HEALTH CARE EDUCATION/TRAINING PROGRAM

## 2022-11-01 PROCEDURE — 65270000029 HC RM PRIVATE

## 2022-11-01 PROCEDURE — 74011000250 HC RX REV CODE- 250: Performed by: STUDENT IN AN ORGANIZED HEALTH CARE EDUCATION/TRAINING PROGRAM

## 2022-11-01 RX ADMIN — METOPROLOL TARTRATE 5 MG: 5 INJECTION INTRAVENOUS at 12:15

## 2022-11-01 RX ADMIN — METOPROLOL TARTRATE 5 MG: 5 INJECTION INTRAVENOUS at 03:55

## 2022-11-01 RX ADMIN — SODIUM CHLORIDE, POTASSIUM CHLORIDE, SODIUM LACTATE AND CALCIUM CHLORIDE 100 ML/HR: 600; 310; 30; 20 INJECTION, SOLUTION INTRAVENOUS at 09:22

## 2022-11-01 RX ADMIN — METOPROLOL TARTRATE 5 MG: 5 INJECTION INTRAVENOUS at 18:27

## 2022-11-01 RX ADMIN — SODIUM CHLORIDE, POTASSIUM CHLORIDE, SODIUM LACTATE AND CALCIUM CHLORIDE 100 ML/HR: 600; 310; 30; 20 INJECTION, SOLUTION INTRAVENOUS at 21:30

## 2022-11-01 RX ADMIN — ENOXAPARIN SODIUM 30 MG: 100 INJECTION SUBCUTANEOUS at 18:22

## 2022-11-01 RX ADMIN — ENOXAPARIN SODIUM 30 MG: 100 INJECTION SUBCUTANEOUS at 09:22

## 2022-11-01 NOTE — PROGRESS NOTES
11/1/2022 4:21 PM   Care Management Progress Note      ICD-10-CM ICD-9-CM    1. Intestinal adhesions with obstruction, unspecified whether partial or complete (HCC)  K56.50 560.81       2. Leukocytosis, unspecified type  D72.829 288.60           RUR:  6%  Risk Level: [x]Low []Moderate []High  Value-based purchasing: [] Yes [x] No  Bundle patient: [] Yes [x] No   Specify:     Transition of care plan:  Ongoing management by General Surgery  Home with family at discharge, no discharge needs identified at this time  Outpatient follow-up.   Pt's wife to transport

## 2022-11-01 NOTE — PROGRESS NOTES
Bedside shift change report given to Max Jaquez (oncoming nurse) by Salvador Dominguez (offgoing nurse). Report included the following information SBAR, Kardex, Intake/Output, MAR, and Recent Results.

## 2022-11-01 NOTE — PROGRESS NOTES
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 CT abdomen/pelvis demonstrated multiple dilated loops of small bowel, including within a parastomal hernia, though hernia itself is not the cause of the obstruction, with transition point in the right pelvis with distal collapsed bowel. Patient states abdominal discomfort is much improved and he had none last night but a very little this afternoon; the ostomy output was increased over the night and today until just this afternoon. Feels less bloated. Denies nausea/vomiting/burping/belching/hiccupping. Ambulating. Wife at bedside. No current facility-administered medications on file prior to encounter. Current Outpatient Medications on File Prior to Encounter   Medication Sig Dispense Refill    tamsulosin (FLOMAX) 0.4 mg capsule Take 1 Cap by mouth two (2) times a day. 20 Cap 0    cholecalciferol, vitamin D3, 50 mcg (2,000 unit) tab Take 1 Tab by mouth daily. iron, carbonyl (FEOSOL) 45 mg tab Take 1 Tab by mouth daily. losartan (COZAAR) 100 mg tablet Take 100 mg by mouth every evening. metoprolol (LOPRESSOR) 50 mg tablet TAKE 1 TABLET BY MOUTH TWICE DAILY 180 Tab 4    aspirin 81 mg tablet Take 81 mg by mouth every evening. MULTIVITAMIN PO Take 1 Tab by mouth daily. pravastatin (PRAVACHOL) 80 mg tablet Take 1 Tab by mouth nightly. 90 Tab 3    amoxicillin-clavulanate (AUGMENTIN) 875-125 mg per tablet Take 1 Tab by mouth two (2) times a day. (Patient not taking: Reported on 10/30/2022) 14 Tab 0    nitroglycerin (NITROSTAT) 0.4 mg SL tablet 0.4 mg by SubLINGual route every five (5) minutes as needed for Chest Pain.  (Patient not taking: Reported on 10/30/2022)         Patient Vitals for the past 24 hrs:   Temp Pulse Resp BP SpO2   11/01/22 1213 -- 76 -- (!) 147/73 --   11/01/22 1156 98.4 °F (36.9 °C) 69 16 (!) 144/72 97 %   11/01/22 0731 98.1 °F (36.7 °C) 71 -- (!) 157/83 95 %   11/01/22 0355 98.1 °F (36.7 °C) 77 18 (!) 152/79 95 %   10/31/22 2036 99 °F (37.2 °C) 78 20 132/63 --   10/31/22 1736 98.2 °F (36.8 °C) 74 20 134/75 --     Date 10/31/22 0700 - 11/01/22 0659 11/01/22 0700 - 11/02/22 0659   Shift 7341-5965 0899-0556 24 Hour Total 9924-4911 3563-3774 24 Hour Total   INTAKE   P.O. 0  0        P. O. 0  0      I. V.(mL/kg/hr)  9198.1(4.2) 2366.7(0.9)        Volume (lactated Ringers infusion)  2366.7 2366.7      Shift Total(mL/kg) 0(0) 5723.6(49.9) 3611.3(30.7)      OUTPUT   Urine(mL/kg/hr)           Urine Occurrence(s) 2 x  2 x      Stool           Stool Occurrence(s) 2 x  2 x      Shift Total(mL/kg)         NET 0 2366.7 2366.7      Weight (kg) 108.9 108.9 108.9 108.9 108.9 108.9     GEN: no acute distress; looks very comfortable and in high spirits  ABD: soft; non-tender; min distension w/o tympany; (+) parastomal hernia w/ bowel reducible; ileostomy bag with min contents      Recent Results (from the past 24 hour(s))   METABOLIC PANEL, BASIC    Collection Time: 11/01/22  1:32 AM   Result Value Ref Range    Sodium 141 136 - 145 mmol/L    Potassium 3.7 3.5 - 5.1 mmol/L    Chloride 107 97 - 108 mmol/L    CO2 27 21 - 32 mmol/L    Anion gap 7 5 - 15 mmol/L    Glucose 84 65 - 100 mg/dL    BUN 16 6 - 20 MG/DL    Creatinine 1.02 0.70 - 1.30 MG/DL    BUN/Creatinine ratio 16 12 - 20      eGFR >60 >60 ml/min/1.73m2    Calcium 8.8 8.5 - 10.1 MG/DL   MAGNESIUM    Collection Time: 11/01/22  1:32 AM   Result Value Ref Range    Magnesium 2.2 1.6 - 2.4 mg/dL   PHOSPHORUS    Collection Time: 11/01/22  1:32 AM   Result Value Ref Range    Phosphorus 2.8 2.6 - 4.7 MG/DL       A/P: Patient is a 76year-old man with history of UC for decades 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  -- Clears today. -- Cont ambulation. Melinda Isbell.  Lindsay Mcmanus MD, FACS

## 2022-11-02 LAB
MAGNESIUM SERPL-MCNC: 2.1 MG/DL (ref 1.6–2.4)
PHOSPHATE SERPL-MCNC: 3.4 MG/DL (ref 2.6–4.7)

## 2022-11-02 PROCEDURE — 74011000250 HC RX REV CODE- 250: Performed by: STUDENT IN AN ORGANIZED HEALTH CARE EDUCATION/TRAINING PROGRAM

## 2022-11-02 PROCEDURE — 74011250637 HC RX REV CODE- 250/637: Performed by: STUDENT IN AN ORGANIZED HEALTH CARE EDUCATION/TRAINING PROGRAM

## 2022-11-02 PROCEDURE — 65270000029 HC RM PRIVATE

## 2022-11-02 PROCEDURE — 74011250636 HC RX REV CODE- 250/636: Performed by: STUDENT IN AN ORGANIZED HEALTH CARE EDUCATION/TRAINING PROGRAM

## 2022-11-02 PROCEDURE — 84100 ASSAY OF PHOSPHORUS: CPT

## 2022-11-02 PROCEDURE — 36415 COLL VENOUS BLD VENIPUNCTURE: CPT

## 2022-11-02 PROCEDURE — 83735 ASSAY OF MAGNESIUM: CPT

## 2022-11-02 RX ORDER — HYDROMORPHONE HYDROCHLORIDE 1 MG/ML
0.5 INJECTION, SOLUTION INTRAMUSCULAR; INTRAVENOUS; SUBCUTANEOUS
Status: DISCONTINUED | OUTPATIENT
Start: 2022-11-02 | End: 2022-11-03 | Stop reason: HOSPADM

## 2022-11-02 RX ORDER — METOPROLOL TARTRATE 50 MG/1
50 TABLET ORAL 2 TIMES DAILY
Status: DISCONTINUED | OUTPATIENT
Start: 2022-11-02 | End: 2022-11-03 | Stop reason: HOSPADM

## 2022-11-02 RX ORDER — TAMSULOSIN HYDROCHLORIDE 0.4 MG/1
0.4 CAPSULE ORAL
Status: DISCONTINUED | OUTPATIENT
Start: 2022-11-02 | End: 2022-11-03 | Stop reason: HOSPADM

## 2022-11-02 RX ORDER — LOSARTAN POTASSIUM 50 MG/1
100 TABLET ORAL DAILY
Status: DISCONTINUED | OUTPATIENT
Start: 2022-11-03 | End: 2022-11-03 | Stop reason: HOSPADM

## 2022-11-02 RX ORDER — PRAVASTATIN SODIUM 20 MG/1
80 TABLET ORAL
Status: DISCONTINUED | OUTPATIENT
Start: 2022-11-02 | End: 2022-11-03 | Stop reason: HOSPADM

## 2022-11-02 RX ORDER — OXYCODONE HYDROCHLORIDE 5 MG/1
5 TABLET ORAL
Status: DISCONTINUED | OUTPATIENT
Start: 2022-11-02 | End: 2022-11-03 | Stop reason: HOSPADM

## 2022-11-02 RX ADMIN — METOPROLOL TARTRATE 5 MG: 5 INJECTION INTRAVENOUS at 00:06

## 2022-11-02 RX ADMIN — ENOXAPARIN SODIUM 30 MG: 100 INJECTION SUBCUTANEOUS at 17:49

## 2022-11-02 RX ADMIN — METOPROLOL TARTRATE 50 MG: 50 TABLET ORAL at 20:43

## 2022-11-02 RX ADMIN — METOPROLOL TARTRATE 5 MG: 5 INJECTION INTRAVENOUS at 05:31

## 2022-11-02 RX ADMIN — METOPROLOL TARTRATE 5 MG: 5 INJECTION INTRAVENOUS at 17:47

## 2022-11-02 RX ADMIN — SODIUM CHLORIDE, POTASSIUM CHLORIDE, SODIUM LACTATE AND CALCIUM CHLORIDE 100 ML/HR: 600; 310; 30; 20 INJECTION, SOLUTION INTRAVENOUS at 08:59

## 2022-11-02 RX ADMIN — TAMSULOSIN HYDROCHLORIDE 0.4 MG: 0.4 CAPSULE ORAL at 20:44

## 2022-11-02 RX ADMIN — PRAVASTATIN SODIUM 80 MG: 20 TABLET ORAL at 20:43

## 2022-11-02 RX ADMIN — METOPROLOL TARTRATE 5 MG: 5 INJECTION INTRAVENOUS at 12:42

## 2022-11-02 RX ADMIN — ENOXAPARIN SODIUM 30 MG: 100 INJECTION SUBCUTANEOUS at 05:31

## 2022-11-02 NOTE — PROGRESS NOTES
Problem: Hypertension  Goal: *Blood pressure within specified parameters  11/2/2022 0916 by Lexie Álvarez RN  Outcome: Progressing Towards Goal  11/2/2022 0916 by Lexie Álvarez RN  Outcome: Progressing Towards Goal  Goal: *Fluid volume balance  11/2/2022 0916 by Lexie Álvarez RN  Outcome: Progressing Towards Goal  11/2/2022 0916 by Lexie Álvarez RN  Outcome: Progressing Towards Goal  Goal: *Labs within defined limits  11/2/2022 0916 by Lexie Álvarez RN  Outcome: Progressing Towards Goal  11/2/2022 0916 by Lexie Álvarez RN  Outcome: Progressing Towards Goal     Problem: Patient Education: Go to Patient Education Activity  Goal: Patient/Family Education  11/2/2022 0916 by Lexie Álvarez RN  Outcome: Progressing Towards Goal  11/2/2022 0916 by Lexie Álvarez RN  Outcome: Progressing Towards Goal     Problem: General Medical Care Plan  Goal: *Vital signs within specified parameters  Outcome: Progressing Towards Goal  Goal: *Labs within defined limits  Outcome: Progressing Towards Goal  Goal: *Absence of infection signs and symptoms  Outcome: Progressing Towards Goal  Goal: *Optimal pain control at patient's stated goal  Outcome: Progressing Towards Goal  Goal: *Skin integrity maintained  Outcome: Progressing Towards Goal  Goal: *Fluid volume balance  Outcome: Progressing Towards Goal  Goal: *Optimize nutritional status  Outcome: Progressing Towards Goal  Goal: *Anxiety reduced or absent  Outcome: Progressing Towards Goal  Goal: *Progressive mobility and function (eg: ADL's)  Outcome: Progressing Towards Goal     Problem: Patient Education: Go to Patient Education Activity  Goal: Patient/Family Education  Outcome: Progressing Towards Goal

## 2022-11-02 NOTE — PROGRESS NOTES
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 CT abdomen/pelvis demonstrated multiple dilated loops of small bowel, including within a parastomal hernia, though hernia itself is not the cause of the obstruction, with transition point in the right pelvis with distal collapsed bowel. Patient states he had some abdominal discomfort after drinking too much too quickly for breakfast; took it more slowly for lunch and dinner and feels fine. Ostomy output has increased. Ambulating. No family currently at bedside. No current facility-administered medications on file prior to encounter. Current Outpatient Medications on File Prior to Encounter   Medication Sig Dispense Refill    tamsulosin (FLOMAX) 0.4 mg capsule Take 1 Cap by mouth two (2) times a day. 20 Cap 0    cholecalciferol, vitamin D3, 50 mcg (2,000 unit) tab Take 1 Tab by mouth daily. iron, carbonyl (FEOSOL) 45 mg tab Take 1 Tab by mouth daily. losartan (COZAAR) 100 mg tablet Take 100 mg by mouth every evening. metoprolol (LOPRESSOR) 50 mg tablet TAKE 1 TABLET BY MOUTH TWICE DAILY 180 Tab 4    aspirin 81 mg tablet Take 81 mg by mouth every evening. MULTIVITAMIN PO Take 1 Tab by mouth daily. pravastatin (PRAVACHOL) 80 mg tablet Take 1 Tab by mouth nightly. 90 Tab 3    amoxicillin-clavulanate (AUGMENTIN) 875-125 mg per tablet Take 1 Tab by mouth two (2) times a day. (Patient not taking: Reported on 10/30/2022) 14 Tab 0    nitroglycerin (NITROSTAT) 0.4 mg SL tablet 0.4 mg by SubLINGual route every five (5) minutes as needed for Chest Pain.  (Patient not taking: Reported on 10/30/2022)         Patient Vitals for the past 24 hrs:   Temp Pulse Resp BP SpO2   11/02/22 1231 98 °F (36.7 °C) 64 -- 130/70 95 %   11/02/22 0916 98 °F (36.7 °C) 65 18 (!) 151/78 96 %   11/02/22 0531 -- 77 -- -- --   11/02/22 0406 98.3 °F (36.8 °C) 70 16 (!) 148/82 97 %   11/02/22 0000 98.2 °F (36.8 °C) 74 16 (!) 144/73 97 %   11/01/22 1929 98.4 °F (36.9 °C) 65 16 130/72 94 %     Date 11/01/22 0700 - 11/02/22 0659 11/02/22 0700 - 11/03/22 0659   Shift 7501-5959 8809-4021 24 Hour Total 9765-1233 6220-2567 24 Hour Total   INTAKE   P.O. 3800  3800 2000 2000     P. O. 3800  3800 2000 2000   Shift Total(mL/kg) 2754(22.3)  7184(57.5) 1201(85.9)  5680(74.3)   OUTPUT   Urine(mL/kg/hr)           Urine Occurrence(s) 2 x  2 x      Stool  300 300        Output (ml) (Ileostomy Abdomen)  300 300      Shift Total(mL/kg)  300(2.8) 300(2.8)      NET 3800 -300 3500 2000 2000   Weight (kg) 108.9 108.9 108.9 108.9 108.9 108.9     GEN: no acute distress; looks very comfortable and in high spirits  ABD: soft; non-tender; no distension; (+) parastomal hernia w/ bowel reducible; ileostomy bag with mod contents and gas      Recent Results (from the past 24 hour(s))   MAGNESIUM    Collection Time: 11/02/22  1:13 AM   Result Value Ref Range    Magnesium 2.1 1.6 - 2.4 mg/dL   PHOSPHORUS    Collection Time: 11/02/22  1:13 AM   Result Value Ref Range    Phosphorus 3.4 2.6 - 4.7 MG/DL       A/P: Patient is a 76year-old man with history of UC for decades 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  -- Full liquids today. GI soft diet starting w/ breakfast tomorrow. -- Home meds. -- Cont ambulation. Arianne Oliva.  Callum Fonseca MD, FACS

## 2022-11-02 NOTE — PROGRESS NOTES
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 CT abdomen/pelvis demonstrated multiple dilated loops of small bowel, including within a parastomal hernia, though hernia itself is not the cause of the obstruction, with transition point in the right pelvis with distal collapsed bowel. Patient states abdominal discomfort is much improved, only slight tenderness over parastomal hernia site. Still believes that output is lower than at home, but he also believes this has to do with the amount he is eating. Tolerated clear liquids. No n/v/belching. Ambulating. No family at bedside. No current facility-administered medications on file prior to encounter. Current Outpatient Medications on File Prior to Encounter   Medication Sig Dispense Refill    tamsulosin (FLOMAX) 0.4 mg capsule Take 1 Cap by mouth two (2) times a day. 20 Cap 0    cholecalciferol, vitamin D3, 50 mcg (2,000 unit) tab Take 1 Tab by mouth daily. iron, carbonyl (FEOSOL) 45 mg tab Take 1 Tab by mouth daily. losartan (COZAAR) 100 mg tablet Take 100 mg by mouth every evening. metoprolol (LOPRESSOR) 50 mg tablet TAKE 1 TABLET BY MOUTH TWICE DAILY 180 Tab 4    aspirin 81 mg tablet Take 81 mg by mouth every evening. MULTIVITAMIN PO Take 1 Tab by mouth daily. pravastatin (PRAVACHOL) 80 mg tablet Take 1 Tab by mouth nightly. 90 Tab 3    amoxicillin-clavulanate (AUGMENTIN) 875-125 mg per tablet Take 1 Tab by mouth two (2) times a day. (Patient not taking: Reported on 10/30/2022) 14 Tab 0    nitroglycerin (NITROSTAT) 0.4 mg SL tablet 0.4 mg by SubLINGual route every five (5) minutes as needed for Chest Pain.  (Patient not taking: Reported on 10/30/2022)         Patient Vitals for the past 24 hrs:   Temp Pulse Resp BP SpO2   11/02/22 0531 -- 77 -- -- --   11/02/22 0406 98.3 °F (36.8 °C) 70 16 (!) 148/82 97 %   11/02/22 0000 98.2 °F (36.8 °C) 74 16 (!) 144/73 97 %   11/01/22 1929 98.4 °F (36.9 °C) 65 16 130/72 94 %   11/01/22 1827 -- 72 -- 138/67 --   11/01/22 1621 98.3 °F (36.8 °C) 75 16 138/67 96 %   11/01/22 1213 -- 76 -- (!) 147/73 --   11/01/22 1156 98.4 °F (36.9 °C) 69 16 (!) 144/72 97 %       Date 11/01/22 0700 - 11/02/22 0659 11/02/22 0700 - 11/03/22 0659   Shift 1502-1046 3220-8723 24 Hour Total 8207-5669 8008-2188 24 Hour Total   INTAKE   P.O. 3800  3800        P. O. 3800  3800      Shift Total(mL/kg) 3800(34.9)  3800(34.9)      OUTPUT   Urine(mL/kg/hr)           Urine Occurrence(s) 2 x  2 x      Stool  300 300        Output (ml) (Ileostomy Abdomen)  300 300      Shift Total(mL/kg)  300(2.8) 300(2.8)      NET 3800 -300 3500      Weight (kg) 108.9 108.9 108.9 108.9 108.9 108.9       GEN: no acute distress; looks very comfortable and in high spirits  ABD: soft; non-tender; min distension w/o tympany; (+) parastomal hernia w/ bowel reducible; ileostomy bag with moderate liquid contents this am       Recent Results (from the past 24 hour(s))   MAGNESIUM    Collection Time: 11/02/22  1:13 AM   Result Value Ref Range    Magnesium 2.1 1.6 - 2.4 mg/dL   PHOSPHORUS    Collection Time: 11/02/22  1:13 AM   Result Value Ref Range    Phosphorus 3.4 2.6 - 4.7 MG/DL       A/P: Patient is a 76year-old man with history of UC for decades 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  -- Adv to fulls  -- Cont ambulation. -- Education on gi lite/low fiber diet for home    Pt discussed with Dr Chary Real. Her assessment and note to follow.   Jayson Salamanca, NP

## 2022-11-02 NOTE — PROGRESS NOTES
11/02/22      Medicare pt has received, reviewed, and signed 2nd IM letter informing them of their right to appeal the discharge. Signed copied has been placed on pt bedside chart.

## 2022-11-03 VITALS
WEIGHT: 240 LBS | DIASTOLIC BLOOD PRESSURE: 75 MMHG | RESPIRATION RATE: 16 BRPM | HEIGHT: 70 IN | SYSTOLIC BLOOD PRESSURE: 136 MMHG | TEMPERATURE: 97.9 F | OXYGEN SATURATION: 97 % | BODY MASS INDEX: 34.36 KG/M2 | HEART RATE: 61 BPM

## 2022-11-03 PROCEDURE — 74011250636 HC RX REV CODE- 250/636: Performed by: STUDENT IN AN ORGANIZED HEALTH CARE EDUCATION/TRAINING PROGRAM

## 2022-11-03 PROCEDURE — 74011250637 HC RX REV CODE- 250/637: Performed by: STUDENT IN AN ORGANIZED HEALTH CARE EDUCATION/TRAINING PROGRAM

## 2022-11-03 RX ADMIN — METOPROLOL TARTRATE 50 MG: 50 TABLET ORAL at 08:57

## 2022-11-03 RX ADMIN — ENOXAPARIN SODIUM 30 MG: 100 INJECTION SUBCUTANEOUS at 05:10

## 2022-11-03 RX ADMIN — LOSARTAN POTASSIUM 100 MG: 50 TABLET, FILM COATED ORAL at 08:57

## 2022-11-03 NOTE — PROGRESS NOTES
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 CT abdomen/pelvis demonstrated multiple dilated loops of small bowel, including within a parastomal hernia, though hernia itself is not the cause of the obstruction, with transition point in the right pelvis with distal collapsed bowel. Patient states he has had no discomfort with advancement of diet this am.  Tolerating well without n/v. Ostomy producing amounts close to baseline now. Ambulating. No family currently at bedside. States he is ready to sleep in his own bed tonight. No current facility-administered medications on file prior to encounter. Current Outpatient Medications on File Prior to Encounter   Medication Sig Dispense Refill    tamsulosin (FLOMAX) 0.4 mg capsule Take 1 Cap by mouth two (2) times a day. 20 Cap 0    cholecalciferol, vitamin D3, 50 mcg (2,000 unit) tab Take 1 Tab by mouth daily. iron, carbonyl (FEOSOL) 45 mg tab Take 1 Tab by mouth daily. losartan (COZAAR) 100 mg tablet Take 100 mg by mouth every evening. metoprolol (LOPRESSOR) 50 mg tablet TAKE 1 TABLET BY MOUTH TWICE DAILY 180 Tab 4    aspirin 81 mg tablet Take 81 mg by mouth every evening. MULTIVITAMIN PO Take 1 Tab by mouth daily. pravastatin (PRAVACHOL) 80 mg tablet Take 1 Tab by mouth nightly. 90 Tab 3    amoxicillin-clavulanate (AUGMENTIN) 875-125 mg per tablet Take 1 Tab by mouth two (2) times a day. (Patient not taking: Reported on 10/30/2022) 14 Tab 0    nitroglycerin (NITROSTAT) 0.4 mg SL tablet 0.4 mg by SubLINGual route every five (5) minutes as needed for Chest Pain.  (Patient not taking: Reported on 10/30/2022)         Patient Vitals for the past 24 hrs:   Temp Pulse Resp BP SpO2   11/03/22 0856 97.7 °F (36.5 °C) 71 16 (!) 145/75 97 %   11/03/22 0335 97.4 °F (36.3 °C) 62 18 122/68 96 %   11/02/22 2300 98.5 °F (36.9 °C) 64 18 122/70 97 %   11/02/22 1952 98.2 °F (36.8 °C) 62 18 (!) 143/90 96 %   11/02/22 1231 98 °F (36.7 °C) 64 -- 130/70 95 %       Date 11/02/22 0700 - 11/03/22 0659 11/03/22 0700 - 11/04/22 0659   Shift 5194-2597 5744-0760 24 Hour Total 9989-7294 2208-3646 24 Hour Total   INTAKE   P.O. 2000 600 2600        P. O. 2000 600 2600      Shift Total(mL/kg) 3020(06.3) 600(5.5) 3990(48.2)      OUTPUT   Shift Total(mL/kg)         NET 2000 600 2600      Weight (kg) 108.9 108.9 108.9 108.9 108.9 108.9       GEN: no acute distress; looks very comfortable and in high spirits  ABD: soft; non-tender; no distension; (+) parastomal hernia w/ bowel reducible; ileostomy bag with mod liquid contents      No results found for this or any previous visit (from the past 24 hour(s)). 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  -- Cont GI soft diet   -- Diet education for home  -- Cont ambulation. -- Anticipate discharge later today if continuing to tolerate diet without issues. Pt discussed with Dr Lindsay Mcmanus. Her assessment and note to follow.   Roney Pandey NP

## 2022-11-03 NOTE — PROGRESS NOTES
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 CT abdomen/pelvis demonstrated multiple dilated loops of small bowel, including within a parastomal hernia, though hernia itself is not the cause of the obstruction, with transition point in the right pelvis with distal collapsed bowel. Patient states he's had no abdominal discomfort. [facility] GI soft diet; denies nausea/vomiting/bloating. Ostomy output is back at baseline. Ambulating. Wife at bedside. No current facility-administered medications on file prior to encounter. Current Outpatient Medications on File Prior to Encounter   Medication Sig Dispense Refill    tamsulosin (FLOMAX) 0.4 mg capsule Take 1 Cap by mouth two (2) times a day. 20 Cap 0    cholecalciferol, vitamin D3, 50 mcg (2,000 unit) tab Take 1 Tab by mouth daily. iron, carbonyl (FEOSOL) 45 mg tab Take 1 Tab by mouth daily. losartan (COZAAR) 100 mg tablet Take 100 mg by mouth every evening. metoprolol (LOPRESSOR) 50 mg tablet TAKE 1 TABLET BY MOUTH TWICE DAILY 180 Tab 4    aspirin 81 mg tablet Take 81 mg by mouth every evening. MULTIVITAMIN PO Take 1 Tab by mouth daily. pravastatin (PRAVACHOL) 80 mg tablet Take 1 Tab by mouth nightly. 90 Tab 3    amoxicillin-clavulanate (AUGMENTIN) 875-125 mg per tablet Take 1 Tab by mouth two (2) times a day. (Patient not taking: Reported on 10/30/2022) 14 Tab 0    nitroglycerin (NITROSTAT) 0.4 mg SL tablet 0.4 mg by SubLINGual route every five (5) minutes as needed for Chest Pain.  (Patient not taking: Reported on 10/30/2022)         Patient Vitals for the past 24 hrs:   Temp Pulse Resp BP SpO2   11/03/22 1131 97.9 °F (36.6 °C) 61 16 136/75 97 % 11/03/22 0856 97.7 °F (36.5 °C) 71 16 (!) 145/75 97 %   11/03/22 0335 97.4 °F (36.3 °C) 62 18 122/68 96 %   11/02/22 2300 98.5 °F (36.9 °C) 64 18 122/70 97 %   11/02/22 1952 98.2 °F (36.8 °C) 62 18 (!) 143/90 96 %     Date 11/02/22 0700 - 11/03/22 0659 11/03/22 0700 - 11/04/22 0659   Shift 1713-1296 2300-0490 24 Hour Total 9584-7334 1381-6319 24 Hour Total   INTAKE   P.O. 2000 600 2600        P. O. 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  -- d/c home today. -- No outpt F/U needed. Haylee Dill.  Angelita Jamison MD, FACS

## 2022-11-03 NOTE — DISCHARGE SUMMARY
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 CT abdomen/pelvis demonstrated multiple dilated loops of small bowel, including within a parastomal hernia, though hernia itself is not the cause of the obstruction, with transition point in the right pelvis with distal collapsed bowel. His small bowel obstruction was managed non-operatively with NGT decompression and bowel rest; NGT was removed and his diet was advanced. Pt was discharged to home on HOD#6 tolerating GI soft diet with baseline ileostomy output.        * Procedures:   * No surgery found *      Consults: None    Significant Diagnostic Studies: radiology: CT scan: multiple dilated loops of small bowel, including within a parastomal hernia, though hernia itself is not the cause of the obstruction, with transition point in the right pelvis with distal collapsed bowel    Discharge Exam:  GEN: no acute distress; looks very comfortable and in high spirits  ABD: soft; non-tender; no distension; (+) parastomal hernia w/ bowel reducible; ileostomy bag with mod contents and gas       * Discharge Condition: improved  * Disposition: Home    Discharge Medications:  Current Discharge Medication List        CONTINUE these medications which have NOT CHANGED    Details   tamsulosin (FLOMAX) 0.4 mg capsule Take 1 Cap by mouth two (2) times a day. Qty: 20 Cap, Refills: 0      cholecalciferol, vitamin D3, 50 mcg (2,000 unit) tab Take 1 Tab by mouth daily. iron, carbonyl (FEOSOL) 45 mg tab Take 1 Tab by mouth daily. losartan (COZAAR) 100 mg tablet Take 100 mg by mouth every evening. metoprolol (LOPRESSOR) 50 mg tablet TAKE 1 TABLET BY MOUTH TWICE DAILY  Qty: 180 Tab, Refills: 4    Comments: **Patient requests 90 day supply**      aspirin 81 mg tablet Take 81 mg by mouth every evening. MULTIVITAMIN PO Take 1 Tab by mouth daily. pravastatin (PRAVACHOL) 80 mg tablet Take 1 Tab by mouth nightly. Qty: 90 Tab, Refills: 3    Comments: **Patient requests 90 day supply**      amoxicillin-clavulanate (AUGMENTIN) 875-125 mg per tablet Take 1 Tab by mouth two (2) times a day. Qty: 14 Tab, Refills: 0      nitroglycerin (NITROSTAT) 0.4 mg SL tablet 0.4 mg by SubLINGual route every five (5) minutes as needed for Chest Pain. * Follow-up Care/Patient Instructions: Activity: Activity as tolerated  Diet: GI soft, low fiber  Wound Care: None needed    Follow-up Information       Follow up With Specialties Details Why Contact Info    Preethi Nieto MD Geriatric Medicine Physician Go on 11/7/2022 HOSPITAL FOLLOW UP AT 2:15PM [address] [facility]   [phone]      Jonas Gilmore MD Surgical Oncology, General Surgery Call As needed [address] [place] [address]  [phone]          No outpt F/U w/ Gen Surg needed.       Signed:  Edenilson Bo MD  11/3/2022  3:19 PM

## 2022-11-03 NOTE — PROGRESS NOTES
Problem: Hypertension  Goal: *Blood pressure within specified parameters  Outcome: Progressing Towards Goal  Goal: *Fluid volume balance  Outcome: Progressing Towards Goal  Goal: *Labs within defined limits  Outcome: Progressing Towards Goal     Problem: Patient Education: Go to Patient Education Activity  Goal: Patient/Family Education  Outcome: Progressing Towards Goal     Problem: General Medical Care Plan  Goal: *Vital signs within specified parameters  Outcome: Progressing Towards Goal  Goal: *Labs within defined limits  Outcome: Progressing Towards Goal  Goal: *Absence of infection signs and symptoms  Outcome: Progressing Towards Goal  Goal: *Optimal pain control at patient's stated goal  Outcome: Progressing Towards Goal  Goal: *Skin integrity maintained  Outcome: Progressing Towards Goal  Goal: *Fluid volume balance  Outcome: Progressing Towards Goal  Goal: *Optimize nutritional status  Outcome: Progressing Towards Goal  Goal: *Anxiety reduced or absent  Outcome: Progressing Towards Goal  Goal: *Progressive mobility and function (eg: ADL's)  Outcome: Progressing Towards Goal     Problem: Patient Education: Go to Patient Education Activity  Goal: Patient/Family Education  Outcome: Progressing Towards Goal

## 2022-11-03 NOTE — PROGRESS NOTES
Discharge instructions presented to patient with the spouse present. All questions and concerns addressed appropriately. Patient awaiting transport via wheelchair by volunteer services to the main entrance.

## 2022-11-03 NOTE — PROGRESS NOTES
11/3/2022 1:52 PM EMR reviewed, noted possible discharge today if pt is able to tolerate his diet. CM will follow. ISMAEL Nunes     Care Management Progress Note         ICD-10-CM ICD-9-CM     1. Intestinal adhesions with obstruction, unspecified whether partial or complete (HCC)  K56.50 560.81         2. Leukocytosis, unspecified type  D72.829 288.60               RUR:  6%  Risk Level: [x]Low []Moderate []High  Value-based purchasing: [] Yes [x] No  Bundle patient: [] Yes [x] No              Specify:      Transition of care plan:  Ongoing management by General Surgery  Home with family at discharge, no discharge needs identified at this time  Outpatient follow-up. Pt's wife to transport    Care Management Interventions  PCP Verified by CM:  Yes (Flora Myers)  Mark Signup: No  Discharge Durable Medical Equipment: No  Physical Therapy Consult: No  Occupational Therapy Consult: No  Speech Therapy Consult: No  Support Systems: Spouse/Significant Other  Discharge Location  Patient Expects to be Discharged to[de-identified] Home with family assistance

## 2023-04-29 ENCOUNTER — APPOINTMENT (OUTPATIENT)
Dept: CT IMAGING | Age: 78
End: 2023-04-29
Attending: STUDENT IN AN ORGANIZED HEALTH CARE EDUCATION/TRAINING PROGRAM
Payer: MEDICARE

## 2023-04-29 ENCOUNTER — HOSPITAL ENCOUNTER (INPATIENT)
Age: 78
LOS: 6 days | Discharge: HOME | End: 2023-05-05
Attending: STUDENT IN AN ORGANIZED HEALTH CARE EDUCATION/TRAINING PROGRAM | Admitting: SURGERY
Payer: MEDICARE

## 2023-04-29 DIAGNOSIS — K56.609 SMALL BOWEL OBSTRUCTION (HCC): Primary | ICD-10-CM

## 2023-04-29 LAB
ALBUMIN SERPL-MCNC: 3.4 G/DL (ref 3.5–5)
ALBUMIN/GLOB SERPL: 0.8 (ref 1.1–2.2)
ALP SERPL-CCNC: 68 U/L (ref 45–117)
ALT SERPL-CCNC: 32 U/L (ref 12–78)
ANION GAP SERPL CALC-SCNC: 6 MMOL/L (ref 5–15)
AST SERPL-CCNC: 18 U/L (ref 15–37)
BASOPHILS # BLD: 0 K/UL (ref 0–0.1)
BASOPHILS NFR BLD: 0 % (ref 0–1)
BILIRUB SERPL-MCNC: 0.9 MG/DL (ref 0.2–1)
BUN SERPL-MCNC: 16 MG/DL (ref 6–20)
BUN/CREAT SERPL: 13 (ref 12–20)
CALCIUM SERPL-MCNC: 9.1 MG/DL (ref 8.5–10.1)
CHLORIDE SERPL-SCNC: 107 MMOL/L (ref 97–108)
CO2 SERPL-SCNC: 25 MMOL/L (ref 21–32)
COMMENT, HOLDF: NORMAL
CREAT SERPL-MCNC: 1.22 MG/DL (ref 0.7–1.3)
DIFFERENTIAL METHOD BLD: ABNORMAL
EOSINOPHIL # BLD: 0.1 K/UL (ref 0–0.4)
EOSINOPHIL NFR BLD: 1 % (ref 0–7)
ERYTHROCYTE [DISTWIDTH] IN BLOOD BY AUTOMATED COUNT: 16.1 % (ref 11.5–14.5)
GLOBULIN SER CALC-MCNC: 4.1 G/DL (ref 2–4)
GLUCOSE SERPL-MCNC: 146 MG/DL (ref 65–100)
HCT VFR BLD AUTO: 44.9 % (ref 36.6–50.3)
HGB BLD-MCNC: 13.9 G/DL (ref 12.1–17)
IMM GRANULOCYTES # BLD AUTO: 0 K/UL (ref 0–0.04)
IMM GRANULOCYTES NFR BLD AUTO: 0 % (ref 0–0.5)
INR PPP: 1.1 (ref 0.9–1.1)
LACTATE BLD-SCNC: 1.39 MMOL/L (ref 0.4–2)
LIPASE SERPL-CCNC: 137 U/L (ref 73–393)
LYMPHOCYTES # BLD: 0.5 K/UL (ref 0.8–3.5)
LYMPHOCYTES NFR BLD: 5 % (ref 12–49)
MCH RBC QN AUTO: 26.4 PG (ref 26–34)
MCHC RBC AUTO-ENTMCNC: 31 G/DL (ref 30–36.5)
MCV RBC AUTO: 85.2 FL (ref 80–99)
MONOCYTES # BLD: 1.2 K/UL (ref 0–1)
MONOCYTES NFR BLD: 11 % (ref 5–13)
NEUTS SEG # BLD: 8.7 K/UL (ref 1.8–8)
NEUTS SEG NFR BLD: 83 % (ref 32–75)
NRBC # BLD: 0 K/UL (ref 0–0.01)
NRBC BLD-RTO: 0 PER 100 WBC
PLATELET # BLD AUTO: 242 K/UL (ref 150–400)
PMV BLD AUTO: 9.9 FL (ref 8.9–12.9)
POTASSIUM SERPL-SCNC: 3.9 MMOL/L (ref 3.5–5.1)
PROT SERPL-MCNC: 7.5 G/DL (ref 6.4–8.2)
PROTHROMBIN TIME: 11.2 SEC (ref 9–11.1)
RBC # BLD AUTO: 5.27 M/UL (ref 4.1–5.7)
RBC MORPH BLD: ABNORMAL
SAMPLES BEING HELD,HOLD: NORMAL
SODIUM SERPL-SCNC: 138 MMOL/L (ref 136–145)
WBC # BLD AUTO: 10.5 K/UL (ref 4.1–11.1)

## 2023-04-29 PROCEDURE — 65270000029 HC RM PRIVATE

## 2023-04-29 PROCEDURE — 83605 ASSAY OF LACTIC ACID: CPT

## 2023-04-29 PROCEDURE — 85610 PROTHROMBIN TIME: CPT

## 2023-04-29 PROCEDURE — 36415 COLL VENOUS BLD VENIPUNCTURE: CPT

## 2023-04-29 PROCEDURE — 85025 COMPLETE CBC W/AUTO DIFF WBC: CPT

## 2023-04-29 PROCEDURE — 74011000636 HC RX REV CODE- 636: Performed by: RADIOLOGY

## 2023-04-29 PROCEDURE — 74011250636 HC RX REV CODE- 250/636: Performed by: STUDENT IN AN ORGANIZED HEALTH CARE EDUCATION/TRAINING PROGRAM

## 2023-04-29 PROCEDURE — 83690 ASSAY OF LIPASE: CPT

## 2023-04-29 PROCEDURE — 96374 THER/PROPH/DIAG INJ IV PUSH: CPT

## 2023-04-29 PROCEDURE — 74011250636 HC RX REV CODE- 250/636: Performed by: SURGERY

## 2023-04-29 PROCEDURE — 99285 EMERGENCY DEPT VISIT HI MDM: CPT

## 2023-04-29 PROCEDURE — 87040 BLOOD CULTURE FOR BACTERIA: CPT

## 2023-04-29 PROCEDURE — 74177 CT ABD & PELVIS W/CONTRAST: CPT

## 2023-04-29 PROCEDURE — 80053 COMPREHEN METABOLIC PANEL: CPT

## 2023-04-29 PROCEDURE — 96375 TX/PRO/DX INJ NEW DRUG ADDON: CPT

## 2023-04-29 RX ORDER — ONDANSETRON 2 MG/ML
4 INJECTION INTRAMUSCULAR; INTRAVENOUS
Status: COMPLETED | OUTPATIENT
Start: 2023-04-29 | End: 2023-04-29

## 2023-04-29 RX ORDER — UREA 10 %
LOTION (ML) TOPICAL
COMMUNITY

## 2023-04-29 RX ORDER — ONDANSETRON 2 MG/ML
4 INJECTION INTRAMUSCULAR; INTRAVENOUS
Status: DISCONTINUED | OUTPATIENT
Start: 2023-04-29 | End: 2023-05-05 | Stop reason: HOSPADM

## 2023-04-29 RX ORDER — SODIUM CHLORIDE, SODIUM LACTATE, POTASSIUM CHLORIDE, CALCIUM CHLORIDE 600; 310; 30; 20 MG/100ML; MG/100ML; MG/100ML; MG/100ML
75 INJECTION, SOLUTION INTRAVENOUS CONTINUOUS
Status: DISCONTINUED | OUTPATIENT
Start: 2023-04-29 | End: 2023-05-01

## 2023-04-29 RX ORDER — VITAMIN B COMPLEX
2500 TABLET ORAL DAILY
COMMUNITY

## 2023-04-29 RX ORDER — MORPHINE SULFATE 2 MG/ML
2 INJECTION, SOLUTION INTRAMUSCULAR; INTRAVENOUS
Status: DISCONTINUED | OUTPATIENT
Start: 2023-04-29 | End: 2023-05-05 | Stop reason: HOSPADM

## 2023-04-29 RX ORDER — FOLIC ACID 1 MG/1
TABLET ORAL DAILY
COMMUNITY

## 2023-04-29 RX ORDER — MORPHINE SULFATE 4 MG/ML
4 INJECTION INTRAVENOUS
Status: COMPLETED | OUTPATIENT
Start: 2023-04-29 | End: 2023-04-29

## 2023-04-29 RX ADMIN — MORPHINE SULFATE 4 MG: 4 INJECTION INTRAVENOUS at 20:19

## 2023-04-29 RX ADMIN — IOPAMIDOL 100 ML: 755 INJECTION, SOLUTION INTRAVENOUS at 20:22

## 2023-04-29 RX ADMIN — ONDANSETRON 4 MG: 2 INJECTION INTRAMUSCULAR; INTRAVENOUS at 20:19

## 2023-04-29 RX ADMIN — SODIUM CHLORIDE, POTASSIUM CHLORIDE, SODIUM LACTATE AND CALCIUM CHLORIDE 75 ML/HR: 600; 310; 30; 20 INJECTION, SOLUTION INTRAVENOUS at 21:42

## 2023-04-29 RX ADMIN — MORPHINE SULFATE 2 MG: 2 INJECTION, SOLUTION INTRAMUSCULAR; INTRAVENOUS at 23:25

## 2023-04-30 LAB
ANION GAP SERPL CALC-SCNC: 4 MMOL/L (ref 5–15)
BUN SERPL-MCNC: 15 MG/DL (ref 6–20)
BUN/CREAT SERPL: 14 (ref 12–20)
CALCIUM SERPL-MCNC: 8.6 MG/DL (ref 8.5–10.1)
CHLORIDE SERPL-SCNC: 108 MMOL/L (ref 97–108)
CO2 SERPL-SCNC: 27 MMOL/L (ref 21–32)
CREAT SERPL-MCNC: 1.1 MG/DL (ref 0.7–1.3)
GLUCOSE SERPL-MCNC: 129 MG/DL (ref 65–100)
LACTATE SERPL-SCNC: 1.6 MMOL/L (ref 0.4–2)
POTASSIUM SERPL-SCNC: 3.9 MMOL/L (ref 3.5–5.1)
SODIUM SERPL-SCNC: 139 MMOL/L (ref 136–145)

## 2023-04-30 PROCEDURE — 83605 ASSAY OF LACTIC ACID: CPT

## 2023-04-30 PROCEDURE — 65270000029 HC RM PRIVATE

## 2023-04-30 PROCEDURE — 74011250636 HC RX REV CODE- 250/636: Performed by: SURGERY

## 2023-04-30 PROCEDURE — 36415 COLL VENOUS BLD VENIPUNCTURE: CPT

## 2023-04-30 PROCEDURE — 80048 BASIC METABOLIC PNL TOTAL CA: CPT

## 2023-04-30 RX ADMIN — SODIUM CHLORIDE, POTASSIUM CHLORIDE, SODIUM LACTATE AND CALCIUM CHLORIDE 75 ML/HR: 600; 310; 30; 20 INJECTION, SOLUTION INTRAVENOUS at 11:35

## 2023-05-01 ENCOUNTER — ANESTHESIA EVENT (OUTPATIENT)
Dept: SURGERY | Age: 78
End: 2023-05-01
Payer: MEDICARE

## 2023-05-01 PROCEDURE — 65270000029 HC RM PRIVATE

## 2023-05-01 PROCEDURE — 74011250636 HC RX REV CODE- 250/636: Performed by: SURGERY

## 2023-05-01 PROCEDURE — 74011250636 HC RX REV CODE- 250/636: Performed by: ANESTHESIOLOGY

## 2023-05-01 RX ORDER — SODIUM CHLORIDE, SODIUM LACTATE, POTASSIUM CHLORIDE, CALCIUM CHLORIDE 600; 310; 30; 20 MG/100ML; MG/100ML; MG/100ML; MG/100ML
125 INJECTION, SOLUTION INTRAVENOUS CONTINUOUS
Status: DISCONTINUED | OUTPATIENT
Start: 2023-05-01 | End: 2023-05-02 | Stop reason: HOSPADM

## 2023-05-01 RX ORDER — HYDROMORPHONE HYDROCHLORIDE 1 MG/ML
.25-1 INJECTION, SOLUTION INTRAMUSCULAR; INTRAVENOUS; SUBCUTANEOUS
Status: DISCONTINUED | OUTPATIENT
Start: 2023-05-01 | End: 2023-05-02 | Stop reason: HOSPADM

## 2023-05-01 RX ORDER — ONDANSETRON 2 MG/ML
4 INJECTION INTRAMUSCULAR; INTRAVENOUS AS NEEDED
Status: DISCONTINUED | OUTPATIENT
Start: 2023-05-01 | End: 2023-05-02 | Stop reason: HOSPADM

## 2023-05-01 RX ORDER — DIPHENHYDRAMINE HYDROCHLORIDE 50 MG/ML
12.5 INJECTION, SOLUTION INTRAMUSCULAR; INTRAVENOUS AS NEEDED
Status: ACTIVE | OUTPATIENT
Start: 2023-05-01 | End: 2023-05-01

## 2023-05-01 RX ADMIN — SODIUM CHLORIDE, POTASSIUM CHLORIDE, SODIUM LACTATE AND CALCIUM CHLORIDE 75 ML/HR: 600; 310; 30; 20 INJECTION, SOLUTION INTRAVENOUS at 00:30

## 2023-05-01 RX ADMIN — SODIUM CHLORIDE, POTASSIUM CHLORIDE, SODIUM LACTATE AND CALCIUM CHLORIDE 125 ML/HR: 600; 310; 30; 20 INJECTION, SOLUTION INTRAVENOUS at 20:30

## 2023-05-01 RX ADMIN — SODIUM CHLORIDE, POTASSIUM CHLORIDE, SODIUM LACTATE AND CALCIUM CHLORIDE 75 ML/HR: 600; 310; 30; 20 INJECTION, SOLUTION INTRAVENOUS at 14:02

## 2023-05-01 NOTE — ROUTINE PROCESS
Bedside and Verbal shift change report given to Chiquita Andrade (oncoming nurse) by Amy Adams (offgoing nurse). Report included the following information SBAR and Kardex.

## 2023-05-01 NOTE — WOUND CARE
Ostomy visit:  Consulted for redness at ostomy site. Met with Soham Hood and his wife. He is good historian of ileostomy for 20+ years. Has peristomal hernia which is causing some issues with wear of appliance and passage of effluent from stoma. For possible revision this stay or at later date. Patient denies need for assistance with ostomy care. They are using [place] appliance, two piece with convexity with belt and need barrier ring/paste for skin fold. They are planning appliance soon. Will look for opportunity to edwardo if surgery this week if he does go. Handed off to Dr. Daniel Elizondo.   Alcon Martin, RN,CWON

## 2023-05-01 NOTE — PROGRESS NOTES
Problem: Falls - Risk of  Goal: *Absence of Falls  Outcome: Progressing Towards Goal  Note: Fall Risk Interventions:                                Problem: Patient Education: Go to Patient Education Activity  Goal: Patient/Family Education  Outcome: Progressing Towards Goal     Problem: Pain  Goal: *Control of Pain  Outcome: Progressing Towards Goal     Problem: Patient Education: Go to Patient Education Activity  Goal: Patient/Family Education  Outcome: Progressing Towards Goal     Problem: Small Bowel Obstruction: Day 2  Goal: Off Pathway (Use only if patient is Off Pathway)  Outcome: Progressing Towards Goal  Goal: Activity/Safety  Outcome: Progressing Towards Goal  Goal: Consults, if ordered  Outcome: Progressing Towards Goal  Goal: Diagnostic Test/Procedures  Outcome: Progressing Towards Goal  Goal: Nutrition/Diet  Outcome: Progressing Towards Goal  Goal: Discharge Planning  Outcome: Progressing Towards Goal  Goal: Medications  Outcome: Progressing Towards Goal  Goal: Respiratory  Outcome: Progressing Towards Goal  Goal: Treatments/Interventions/Procedures  Outcome: Progressing Towards Goal  Goal: Psychosocial  Outcome: Progressing Towards Goal  Goal: *Optimal pain control at patient's stated goal  Outcome: Progressing Towards Goal  Goal: *Adequate urinary output (equal to or greater than 30 milliliters/hour)  Outcome: Progressing Towards Goal  Goal: *Hemodynamically stable  Outcome: Progressing Towards Goal  Goal: *Demonstrates progressive activity  Outcome: Progressing Towards Goal  Goal: *Absence of nausea/vomiting  Outcome: Progressing Towards Goal  Goal: *Return of normal bowel function  Outcome: Progressing Towards Goal

## 2023-05-01 NOTE — PROGRESS NOTES
Reason for Admission:  SBO                   RUR Score:     8%                Plan for utilizing home health:    Not indicated      PCP: First and Last name:  Sharon Salgado MD   Name of Practice:    Are you a current patient: Yes/No:    Approximate date of last visit:    Can you participate in a virtual visit with your PCP:                     Current Advanced Directive/Advance Care Plan: Prior; Wife to bring a copy of patient's AMD to hospital tomorrow    Healthcare Decision Maker:   Merline Model, spouse - [phone]                  Transition of Care Plan:     Patient lives with his spouse in a one story home with 4 steps to enter. Patient is independent with ADLs and ambulation, and he is able to drive. Patient has had [place] in the past (20 years ago). No reported DME use. His preferred pharmacy is [facility] in [place]. CM to follow  Await further evaluation and response to treatment  Home with family when medically cleared  Wife will transport at discharge  Outpatient follow-up    Care Management Interventions  PCP Verified by CM:  Yes  Support Systems: Spouse/Significant Other  Confirm Follow Up Transport: Family  The Plan for Transition of Care is Related to the Following Treatment Goals : SBO  Discharge Location  Patient Expects to be Discharged to[de-identified] Home     Dea Klein LCSW

## 2023-05-01 NOTE — PROGRESS NOTES
Assessment / Plan:   Still with pain but improved  Fulls today - if pain continues consider OR tomorrow for parastomal hernia repair and re-siting of ileostomy    Amy Hayes MD  [facility]  [phone]        General Surgery Daily Progress Note      Patient: Marilee Hernadnez MRN: [id number]  SSN: [id number]    YOB: 1945  Age: 76 y.o. Sex: male          Subjective:   Patient feels better still with pain    Current Facility-Administered Medications   Medication Dose Route Frequency    lactated Ringers infusion  75 mL/hr IntraVENous CONTINUOUS    morphine injection 2 mg  2 mg IntraVENous Q3H PRN    ondansetron (ZOFRAN) injection 4 mg  4 mg IntraVENous Q6H PRN        Objective:   No intake/output data recorded. 04/29 1901 - 05/01 0700  In: 3271.3 [P.O.:830; I.V.:2441.3]  Out: 2300   Patient Vitals for the past 8 hrs:   BP Temp Pulse Resp SpO2   05/01/23 0837 129/77 98.4 °F (36.9 °C) 82 17 96 %       Physical Exam:  General: Alert, cooperative, no distress, appears stated age. Neck:  Supple, symmetrical, trachea midline. Lungs: Clear to auscultation bilaterally. Heart:  Regular rate and rhythm,. Abdomen: Soft, tender. Bowel sounds normal. Ostomy productive  Extremities: Extremities normal, atraumatic, no cyanosis or edema.   Skin:  Skin color, texture, turgor normal. No rashes or lesions    Labs:   Recent Labs     04/29/23  1930   WBC 10.5   HGB 13.9   HCT 44.9        Recent Labs     04/30/23  0240 04/29/23  1930    138   K 3.9 3.9    107   CO2 27 25   * 146*   BUN 15 16   CREA 1.10 1.22   CA 8.6 9.1   ALB  --  3.4*   TBILI  --  0.9   ALT  --  32           Active Problems:    Small bowel obstruction ([facility] (4/29/2023)        Problem List Items Addressed This Visit       [facility] - Primary

## 2023-05-02 ENCOUNTER — ANESTHESIA (OUTPATIENT)
Dept: SURGERY | Age: 78
End: 2023-05-02
Payer: MEDICARE

## 2023-05-02 PROCEDURE — 77030009978 HC RELD STPLR TCR J&J -B: Performed by: SURGERY

## 2023-05-02 PROCEDURE — 77030040922 HC BLNKT HYPOTHRM STRY -A

## 2023-05-02 PROCEDURE — 77030026102 HC DEV TISS ENSEAL G2 J&J -F: Performed by: SURGERY

## 2023-05-02 PROCEDURE — 77030002916 HC SUT ETHLN J&J -A: Performed by: SURGERY

## 2023-05-02 PROCEDURE — 74011250636 HC RX REV CODE- 250/636: Performed by: SURGERY

## 2023-05-02 PROCEDURE — 77030026438 HC STYL ET INTUB CARD -A: Performed by: ANESTHESIOLOGY

## 2023-05-02 PROCEDURE — 77030040361 HC SLV COMPR DVT MDII -B

## 2023-05-02 PROCEDURE — 77030002996 HC SUT SLK J&J -A: Performed by: SURGERY

## 2023-05-02 PROCEDURE — 77030011264 HC ELECTRD BLD EXT COVD -A: Performed by: SURGERY

## 2023-05-02 PROCEDURE — 74011000250 HC RX REV CODE- 250: Performed by: NURSE ANESTHETIST, CERTIFIED REGISTERED

## 2023-05-02 PROCEDURE — 77030010507 HC ADH SKN DERMBND J&J -B: Performed by: SURGERY

## 2023-05-02 PROCEDURE — 76060000039 HC ANESTHESIA 4 TO 4.5 HR: Performed by: SURGERY

## 2023-05-02 PROCEDURE — 77030008684 HC TU ET CUF COVD -B: Performed by: ANESTHESIOLOGY

## 2023-05-02 PROCEDURE — 74011250636 HC RX REV CODE- 250/636: Performed by: NURSE ANESTHETIST, CERTIFIED REGISTERED

## 2023-05-02 PROCEDURE — 77030002966 HC SUT PDS J&J -A: Performed by: SURGERY

## 2023-05-02 PROCEDURE — 77030008463 HC STPLR SKN PROX J&J -B: Performed by: SURGERY

## 2023-05-02 PROCEDURE — 74011000250 HC RX REV CODE- 250: Performed by: SURGERY

## 2023-05-02 PROCEDURE — 76210000016 HC OR PH I REC 1 TO 1.5 HR: Performed by: SURGERY

## 2023-05-02 PROCEDURE — 65270000029 HC RM PRIVATE

## 2023-05-02 PROCEDURE — 77030003029 HC SUT VCRL J&J -B: Performed by: SURGERY

## 2023-05-02 PROCEDURE — 76010000135 HC OR TIME 4 TO 4.5 HR: Performed by: SURGERY

## 2023-05-02 PROCEDURE — C9290 INJ, BUPIVACAINE LIPOSOME: HCPCS | Performed by: SURGERY

## 2023-05-02 PROCEDURE — 74011250636 HC RX REV CODE- 250/636: Performed by: ANESTHESIOLOGY

## 2023-05-02 PROCEDURE — 2709999900 HC NON-CHARGEABLE SUPPLY: Performed by: SURGERY

## 2023-05-02 PROCEDURE — 77030040506 HC DRN WND MDII -A: Performed by: SURGERY

## 2023-05-02 PROCEDURE — 74011250637 HC RX REV CODE- 250/637: Performed by: SURGERY

## 2023-05-02 PROCEDURE — 77030018809 HC RETRCTR ALXSO DISP AMR -B: Performed by: SURGERY

## 2023-05-02 PROCEDURE — 77030036731 HC STPLR ENDOSC J&J -F: Performed by: SURGERY

## 2023-05-02 PROCEDURE — C1781 MESH (IMPLANTABLE): HCPCS | Performed by: SURGERY

## 2023-05-02 PROCEDURE — 88307 TISSUE EXAM BY PATHOLOGIST: CPT

## 2023-05-02 PROCEDURE — 77030031139 HC SUT VCRL2 J&J -A: Performed by: SURGERY

## 2023-05-02 PROCEDURE — 77030013079 HC BLNKT BAIR HGGR 3M -A: Performed by: ANESTHESIOLOGY

## 2023-05-02 PROCEDURE — 77030037255 HC PCH OST FLX SENSURA COLO -A: Performed by: SURGERY

## 2023-05-02 DEVICE — MESH HERN W12XL12IN INGUINAL POLYPR SQ L PORE MFIL SF: Type: IMPLANTABLE DEVICE | Site: ABDOMEN | Status: FUNCTIONAL

## 2023-05-02 RX ORDER — KETOROLAC TROMETHAMINE 30 MG/ML
15 INJECTION, SOLUTION INTRAMUSCULAR; INTRAVENOUS
Status: ACTIVE | OUTPATIENT
Start: 2023-05-02 | End: 2023-05-05

## 2023-05-02 RX ORDER — FENTANYL CITRATE 50 UG/ML
INJECTION, SOLUTION INTRAMUSCULAR; INTRAVENOUS AS NEEDED
Status: DISCONTINUED | OUTPATIENT
Start: 2023-05-02 | End: 2023-05-02 | Stop reason: HOSPADM

## 2023-05-02 RX ORDER — LIDOCAINE HYDROCHLORIDE 10 MG/ML
0.1 INJECTION, SOLUTION EPIDURAL; INFILTRATION; INTRACAUDAL; PERINEURAL AS NEEDED
Status: DISCONTINUED | OUTPATIENT
Start: 2023-05-02 | End: 2023-05-02 | Stop reason: HOSPADM

## 2023-05-02 RX ORDER — OXYCODONE HYDROCHLORIDE 5 MG/1
5 TABLET ORAL
Status: DISCONTINUED | OUTPATIENT
Start: 2023-05-02 | End: 2023-05-05

## 2023-05-02 RX ORDER — DEXAMETHASONE SODIUM PHOSPHATE 4 MG/ML
INJECTION, SOLUTION INTRA-ARTICULAR; INTRALESIONAL; INTRAMUSCULAR; INTRAVENOUS; SOFT TISSUE AS NEEDED
Status: DISCONTINUED | OUTPATIENT
Start: 2023-05-02 | End: 2023-05-02 | Stop reason: HOSPADM

## 2023-05-02 RX ORDER — ACETAMINOPHEN 325 MG/1
975 TABLET ORAL EVERY 6 HOURS
Status: DISCONTINUED | OUTPATIENT
Start: 2023-05-02 | End: 2023-05-05 | Stop reason: HOSPADM

## 2023-05-02 RX ORDER — METOPROLOL TARTRATE 5 MG/5ML
INJECTION INTRAVENOUS AS NEEDED
Status: DISCONTINUED | OUTPATIENT
Start: 2023-05-02 | End: 2023-05-02 | Stop reason: HOSPADM

## 2023-05-02 RX ORDER — VANCOMYCIN HYDROCHLORIDE 1 G/20ML
INJECTION, POWDER, LYOPHILIZED, FOR SOLUTION INTRAVENOUS AS NEEDED
Status: DISCONTINUED | OUTPATIENT
Start: 2023-05-02 | End: 2023-05-02 | Stop reason: HOSPADM

## 2023-05-02 RX ORDER — PHENYLEPHRINE HCL IN 0.9% NACL 0.4MG/10ML
SYRINGE (ML) INTRAVENOUS AS NEEDED
Status: DISCONTINUED | OUTPATIENT
Start: 2023-05-02 | End: 2023-05-02 | Stop reason: HOSPADM

## 2023-05-02 RX ORDER — LIDOCAINE HYDROCHLORIDE 20 MG/ML
INJECTION, SOLUTION EPIDURAL; INFILTRATION; INTRACAUDAL; PERINEURAL AS NEEDED
Status: DISCONTINUED | OUTPATIENT
Start: 2023-05-02 | End: 2023-05-02 | Stop reason: HOSPADM

## 2023-05-02 RX ORDER — CEFAZOLIN SODIUM 1 G/3ML
INJECTION, POWDER, FOR SOLUTION INTRAMUSCULAR; INTRAVENOUS AS NEEDED
Status: DISCONTINUED | OUTPATIENT
Start: 2023-05-02 | End: 2023-05-02

## 2023-05-02 RX ORDER — SODIUM CHLORIDE, SODIUM LACTATE, POTASSIUM CHLORIDE, CALCIUM CHLORIDE 600; 310; 30; 20 MG/100ML; MG/100ML; MG/100ML; MG/100ML
75 INJECTION, SOLUTION INTRAVENOUS CONTINUOUS
Status: DISCONTINUED | OUTPATIENT
Start: 2023-05-02 | End: 2023-05-02 | Stop reason: HOSPADM

## 2023-05-02 RX ORDER — PRAVASTATIN SODIUM 20 MG/1
80 TABLET ORAL
Status: DISCONTINUED | OUTPATIENT
Start: 2023-05-02 | End: 2023-05-05 | Stop reason: HOSPADM

## 2023-05-02 RX ORDER — METOPROLOL TARTRATE 50 MG/1
50 TABLET ORAL 2 TIMES DAILY
Status: DISCONTINUED | OUTPATIENT
Start: 2023-05-02 | End: 2023-05-05 | Stop reason: HOSPADM

## 2023-05-02 RX ORDER — LOSARTAN POTASSIUM 50 MG/1
100 TABLET ORAL EVERY EVENING
Status: DISCONTINUED | OUTPATIENT
Start: 2023-05-02 | End: 2023-05-05 | Stop reason: HOSPADM

## 2023-05-02 RX ORDER — PROPOFOL 10 MG/ML
INJECTION, EMULSION INTRAVENOUS
Status: DISCONTINUED | OUTPATIENT
Start: 2023-05-02 | End: 2023-05-02 | Stop reason: HOSPADM

## 2023-05-02 RX ORDER — TAMSULOSIN HYDROCHLORIDE 0.4 MG/1
0.4 CAPSULE ORAL 2 TIMES DAILY
Status: DISCONTINUED | OUTPATIENT
Start: 2023-05-02 | End: 2023-05-05 | Stop reason: HOSPADM

## 2023-05-02 RX ORDER — PROPOFOL 10 MG/ML
INJECTION, EMULSION INTRAVENOUS AS NEEDED
Status: DISCONTINUED | OUTPATIENT
Start: 2023-05-02 | End: 2023-05-02 | Stop reason: HOSPADM

## 2023-05-02 RX ORDER — ENOXAPARIN SODIUM 100 MG/ML
30 INJECTION SUBCUTANEOUS EVERY 12 HOURS
Status: DISCONTINUED | OUTPATIENT
Start: 2023-05-02 | End: 2023-05-05 | Stop reason: HOSPADM

## 2023-05-02 RX ORDER — ROCURONIUM BROMIDE 10 MG/ML
INJECTION, SOLUTION INTRAVENOUS AS NEEDED
Status: DISCONTINUED | OUTPATIENT
Start: 2023-05-02 | End: 2023-05-02 | Stop reason: HOSPADM

## 2023-05-02 RX ORDER — ENOXAPARIN SODIUM 100 MG/ML
40 INJECTION SUBCUTANEOUS EVERY 24 HOURS
Status: DISCONTINUED | OUTPATIENT
Start: 2023-05-02 | End: 2023-05-02 | Stop reason: DRUGHIGH

## 2023-05-02 RX ORDER — SODIUM CHLORIDE, SODIUM LACTATE, POTASSIUM CHLORIDE, CALCIUM CHLORIDE 600; 310; 30; 20 MG/100ML; MG/100ML; MG/100ML; MG/100ML
INJECTION, SOLUTION INTRAVENOUS
Status: DISCONTINUED | OUTPATIENT
Start: 2023-05-02 | End: 2023-05-02 | Stop reason: HOSPADM

## 2023-05-02 RX ADMIN — FENTANYL CITRATE 50 MCG: 50 INJECTION, SOLUTION INTRAMUSCULAR; INTRAVENOUS at 08:28

## 2023-05-02 RX ADMIN — METOPROLOL TARTRATE 2 MG: 5 INJECTION, SOLUTION INTRAVENOUS at 07:47

## 2023-05-02 RX ADMIN — ROCURONIUM BROMIDE 50 MG: 10 INJECTION INTRAVENOUS at 07:37

## 2023-05-02 RX ADMIN — OXYCODONE 5 MG: 5 TABLET ORAL at 22:27

## 2023-05-02 RX ADMIN — PROPOFOL 50 MCG/KG/MIN: 10 INJECTION, EMULSION INTRAVENOUS at 07:45

## 2023-05-02 RX ADMIN — PRAVASTATIN SODIUM 80 MG: 20 TABLET ORAL at 22:22

## 2023-05-02 RX ADMIN — FENTANYL CITRATE 100 MCG: 50 INJECTION, SOLUTION INTRAMUSCULAR; INTRAVENOUS at 07:36

## 2023-05-02 RX ADMIN — SUGAMMADEX 200 MG: 100 INJECTION, SOLUTION INTRAVENOUS at 11:26

## 2023-05-02 RX ADMIN — Medication 40 MCG: at 08:03

## 2023-05-02 RX ADMIN — Medication 80 MCG: at 10:27

## 2023-05-02 RX ADMIN — CEFAZOLIN SODIUM 2 G: 1 POWDER, FOR SOLUTION INTRAMUSCULAR; INTRAVENOUS at 07:57

## 2023-05-02 RX ADMIN — OXYCODONE 5 MG: 5 TABLET ORAL at 17:02

## 2023-05-02 RX ADMIN — TAMSULOSIN HYDROCHLORIDE 0.4 MG: 0.4 CAPSULE ORAL at 14:18

## 2023-05-02 RX ADMIN — Medication 80 MCG: at 11:18

## 2023-05-02 RX ADMIN — ROCURONIUM BROMIDE 10 MG: 10 INJECTION INTRAVENOUS at 10:39

## 2023-05-02 RX ADMIN — FENTANYL CITRATE 50 MCG: 50 INJECTION, SOLUTION INTRAMUSCULAR; INTRAVENOUS at 08:21

## 2023-05-02 RX ADMIN — Medication 40 MCG: at 08:05

## 2023-05-02 RX ADMIN — PROPOFOL 160 MG: 10 INJECTION, EMULSION INTRAVENOUS at 07:36

## 2023-05-02 RX ADMIN — DEXAMETHASONE SODIUM PHOSPHATE 4 MG: 4 INJECTION, SOLUTION INTRAMUSCULAR; INTRAVENOUS at 08:00

## 2023-05-02 RX ADMIN — ACETAMINOPHEN 975 MG: 325 TABLET ORAL at 14:18

## 2023-05-02 RX ADMIN — ENOXAPARIN SODIUM 30 MG: 100 INJECTION SUBCUTANEOUS at 22:22

## 2023-05-02 RX ADMIN — HYDROMORPHONE HYDROCHLORIDE 0.5 MG: 1 INJECTION, SOLUTION INTRAMUSCULAR; INTRAVENOUS; SUBCUTANEOUS at 12:30

## 2023-05-02 RX ADMIN — METOPROLOL TARTRATE 50 MG: 50 TABLET ORAL at 14:18

## 2023-05-02 RX ADMIN — METOPROLOL TARTRATE 1 MG: 5 INJECTION, SOLUTION INTRAVENOUS at 10:56

## 2023-05-02 RX ADMIN — ACETAMINOPHEN 975 MG: 325 TABLET ORAL at 23:11

## 2023-05-02 RX ADMIN — LOSARTAN POTASSIUM 100 MG: 50 TABLET, FILM COATED ORAL at 17:06

## 2023-05-02 RX ADMIN — SODIUM CHLORIDE, POTASSIUM CHLORIDE, SODIUM LACTATE AND CALCIUM CHLORIDE: 600; 310; 30; 20 INJECTION, SOLUTION INTRAVENOUS at 07:50

## 2023-05-02 RX ADMIN — MORPHINE SULFATE 2 MG: 2 INJECTION, SOLUTION INTRAMUSCULAR; INTRAVENOUS at 15:02

## 2023-05-02 RX ADMIN — FENTANYL CITRATE 50 MCG: 50 INJECTION, SOLUTION INTRAMUSCULAR; INTRAVENOUS at 08:59

## 2023-05-02 RX ADMIN — LIDOCAINE HYDROCHLORIDE 60 MG: 20 INJECTION, SOLUTION EPIDURAL; INFILTRATION; INTRACAUDAL; PERINEURAL at 07:36

## 2023-05-02 RX ADMIN — ROCURONIUM BROMIDE 10 MG: 10 INJECTION INTRAVENOUS at 08:49

## 2023-05-02 RX ADMIN — PROPOFOL 20 MG: 10 INJECTION, EMULSION INTRAVENOUS at 07:37

## 2023-05-02 RX ADMIN — METOPROLOL TARTRATE 2 MG: 5 INJECTION, SOLUTION INTRAVENOUS at 10:15

## 2023-05-02 RX ADMIN — ROCURONIUM BROMIDE 10 MG: 10 INJECTION INTRAVENOUS at 08:44

## 2023-05-02 RX ADMIN — SODIUM CHLORIDE, POTASSIUM CHLORIDE, SODIUM LACTATE AND CALCIUM CHLORIDE 75 ML/HR: 600; 310; 30; 20 INJECTION, SOLUTION INTRAVENOUS at 06:56

## 2023-05-02 RX ADMIN — ROCURONIUM BROMIDE 10 MG: 10 INJECTION INTRAVENOUS at 09:39

## 2023-05-02 NOTE — PROGRESS NOTES
Bedside and Verbal shift change report given to Rosendo Turk RN (oncoming nurse) by Jessa Allen LPN (offgoing nurse). Report included the following information SBAR, Kardex, Procedure Summary, and Recent Results.

## 2023-05-03 PROCEDURE — 2709999900 HC NON-CHARGEABLE SUPPLY

## 2023-05-03 PROCEDURE — 74011250636 HC RX REV CODE- 250/636: Performed by: SURGERY

## 2023-05-03 PROCEDURE — 65270000029 HC RM PRIVATE

## 2023-05-03 PROCEDURE — 74011250637 HC RX REV CODE- 250/637: Performed by: SURGERY

## 2023-05-03 RX ADMIN — METOPROLOL TARTRATE 50 MG: 50 TABLET ORAL at 18:39

## 2023-05-03 RX ADMIN — OXYCODONE 5 MG: 5 TABLET ORAL at 05:37

## 2023-05-03 RX ADMIN — ACETAMINOPHEN 975 MG: 325 TABLET ORAL at 05:20

## 2023-05-03 RX ADMIN — LOSARTAN POTASSIUM 100 MG: 50 TABLET, FILM COATED ORAL at 18:39

## 2023-05-03 RX ADMIN — ACETAMINOPHEN 975 MG: 325 TABLET ORAL at 23:56

## 2023-05-03 RX ADMIN — ENOXAPARIN SODIUM 30 MG: 100 INJECTION SUBCUTANEOUS at 12:17

## 2023-05-03 RX ADMIN — TAMSULOSIN HYDROCHLORIDE 0.4 MG: 0.4 CAPSULE ORAL at 08:41

## 2023-05-03 RX ADMIN — TAMSULOSIN HYDROCHLORIDE 0.4 MG: 0.4 CAPSULE ORAL at 18:38

## 2023-05-03 RX ADMIN — ACETAMINOPHEN 975 MG: 325 TABLET ORAL at 12:17

## 2023-05-03 RX ADMIN — ACETAMINOPHEN 975 MG: 325 TABLET ORAL at 18:39

## 2023-05-03 RX ADMIN — PRAVASTATIN SODIUM 80 MG: 20 TABLET ORAL at 21:49

## 2023-05-03 RX ADMIN — ENOXAPARIN SODIUM 30 MG: 100 INJECTION SUBCUTANEOUS at 23:56

## 2023-05-03 RX ADMIN — OXYCODONE 5 MG: 5 TABLET ORAL at 21:49

## 2023-05-03 RX ADMIN — OXYCODONE 5 MG: 5 TABLET ORAL at 12:17

## 2023-05-04 LAB
BACTERIA SPEC CULT: NORMAL
SERVICE CMNT-IMP: NORMAL

## 2023-05-04 PROCEDURE — 0DB84ZZ EXCISION OF SMALL INTESTINE, PERCUTANEOUS ENDOSCOPIC APPROACH: ICD-10-PCS | Performed by: SURGERY

## 2023-05-04 PROCEDURE — 74011250636 HC RX REV CODE- 250/636: Performed by: SURGERY

## 2023-05-04 PROCEDURE — 0WUF4JZ SUPPLEMENT ABDOMINAL WALL WITH SYNTHETIC SUBSTITUTE, PERCUTANEOUS ENDOSCOPIC APPROACH: ICD-10-PCS | Performed by: SURGERY

## 2023-05-04 PROCEDURE — 74011250637 HC RX REV CODE- 250/637: Performed by: SURGERY

## 2023-05-04 PROCEDURE — 65270000029 HC RM PRIVATE

## 2023-05-04 RX ADMIN — ACETAMINOPHEN 975 MG: 325 TABLET ORAL at 11:39

## 2023-05-04 RX ADMIN — TAMSULOSIN HYDROCHLORIDE 0.4 MG: 0.4 CAPSULE ORAL at 18:44

## 2023-05-04 RX ADMIN — METOPROLOL TARTRATE 50 MG: 50 TABLET ORAL at 08:42

## 2023-05-04 RX ADMIN — ACETAMINOPHEN 975 MG: 325 TABLET ORAL at 23:06

## 2023-05-04 RX ADMIN — ACETAMINOPHEN 975 MG: 325 TABLET ORAL at 06:19

## 2023-05-04 RX ADMIN — ACETAMINOPHEN 975 MG: 325 TABLET ORAL at 18:45

## 2023-05-04 RX ADMIN — TAMSULOSIN HYDROCHLORIDE 0.4 MG: 0.4 CAPSULE ORAL at 08:42

## 2023-05-04 RX ADMIN — OXYCODONE 5 MG: 5 TABLET ORAL at 08:43

## 2023-05-04 RX ADMIN — MORPHINE SULFATE 2 MG: 2 INJECTION, SOLUTION INTRAMUSCULAR; INTRAVENOUS at 13:45

## 2023-05-04 RX ADMIN — ENOXAPARIN SODIUM 30 MG: 100 INJECTION SUBCUTANEOUS at 23:07

## 2023-05-04 RX ADMIN — ENOXAPARIN SODIUM 30 MG: 100 INJECTION SUBCUTANEOUS at 11:39

## 2023-05-04 RX ADMIN — PRAVASTATIN SODIUM 80 MG: 20 TABLET ORAL at 23:06

## 2023-05-05 VITALS
RESPIRATION RATE: 17 BRPM | WEIGHT: 240 LBS | BODY MASS INDEX: 34.36 KG/M2 | DIASTOLIC BLOOD PRESSURE: 75 MMHG | OXYGEN SATURATION: 96 % | HEIGHT: 70 IN | HEART RATE: 74 BPM | TEMPERATURE: 98.6 F | SYSTOLIC BLOOD PRESSURE: 124 MMHG

## 2023-05-05 LAB
CREAT SERPL-MCNC: 1.05 MG/DL (ref 0.7–1.3)
ERYTHROCYTE [DISTWIDTH] IN BLOOD BY AUTOMATED COUNT: 15.8 % (ref 11.5–14.5)
HCT VFR BLD AUTO: 35 % (ref 36.6–50.3)
HGB BLD-MCNC: 11 G/DL (ref 12.1–17)
MCH RBC QN AUTO: 26.1 PG (ref 26–34)
MCHC RBC AUTO-ENTMCNC: 31.4 G/DL (ref 30–36.5)
MCV RBC AUTO: 83.1 FL (ref 80–99)
NRBC # BLD: 0 K/UL (ref 0–0.01)
NRBC BLD-RTO: 0 PER 100 WBC
PLATELET # BLD AUTO: 206 K/UL (ref 150–400)
PMV BLD AUTO: 9.7 FL (ref 8.9–12.9)
RBC # BLD AUTO: 4.21 M/UL (ref 4.1–5.7)
WBC # BLD AUTO: 8.9 K/UL (ref 4.1–11.1)

## 2023-05-05 PROCEDURE — 36415 COLL VENOUS BLD VENIPUNCTURE: CPT

## 2023-05-05 PROCEDURE — 74011250637 HC RX REV CODE- 250/637: Performed by: SURGERY

## 2023-05-05 PROCEDURE — 85027 COMPLETE CBC AUTOMATED: CPT

## 2023-05-05 PROCEDURE — 82565 ASSAY OF CREATININE: CPT

## 2023-05-05 RX ORDER — IBUPROFEN 200 MG
600 TABLET ORAL
Qty: 30 TABLET | Refills: 1 | Status: SHIPPED | OUTPATIENT
Start: 2023-05-05

## 2023-05-05 RX ORDER — OXYCODONE HYDROCHLORIDE 5 MG/1
10 TABLET ORAL
Status: DISCONTINUED | OUTPATIENT
Start: 2023-05-05 | End: 2023-05-05 | Stop reason: HOSPADM

## 2023-05-05 RX ORDER — OXYCODONE HYDROCHLORIDE 5 MG/1
5 TABLET ORAL
Qty: 30 TABLET | Refills: 0 | Status: SHIPPED | OUTPATIENT
Start: 2023-05-05 | End: 2023-05-19

## 2023-05-05 RX ADMIN — ACETAMINOPHEN 975 MG: 325 TABLET ORAL at 06:12

## 2023-05-05 RX ADMIN — METOPROLOL TARTRATE 50 MG: 50 TABLET ORAL at 08:08

## 2023-05-05 RX ADMIN — TAMSULOSIN HYDROCHLORIDE 0.4 MG: 0.4 CAPSULE ORAL at 08:08

## 2023-05-05 RX ADMIN — OXYCODONE HYDROCHLORIDE 10 MG: 5 TABLET ORAL at 12:06

## 2023-05-20 ENCOUNTER — APPOINTMENT (OUTPATIENT)
Facility: HOSPITAL | Age: 78
DRG: 863 | End: 2023-05-20
Payer: MEDICARE

## 2023-05-20 ENCOUNTER — HOSPITAL ENCOUNTER (INPATIENT)
Facility: HOSPITAL | Age: 78
LOS: 3 days | Discharge: HOME HEALTH | DRG: 863 | End: 2023-05-23
Attending: STUDENT IN AN ORGANIZED HEALTH CARE EDUCATION/TRAINING PROGRAM | Admitting: SURGERY
Payer: MEDICARE

## 2023-05-20 DIAGNOSIS — A41.9 SEPSIS, DUE TO UNSPECIFIED ORGANISM, UNSPECIFIED WHETHER ACUTE ORGAN DYSFUNCTION PRESENT (HCC): Primary | ICD-10-CM

## 2023-05-20 DIAGNOSIS — T81.89XA DRAINING POSTOPERATIVE WOUND, INITIAL ENCOUNTER: ICD-10-CM

## 2023-05-20 DIAGNOSIS — R79.89 ELEVATED SERUM CREATININE: ICD-10-CM

## 2023-05-20 DIAGNOSIS — E87.1 HYPONATREMIA: ICD-10-CM

## 2023-05-20 PROBLEM — T81.49XA WOUND INFECTION AFTER SURGERY: Status: ACTIVE | Noted: 2023-05-20

## 2023-05-20 LAB
ALBUMIN SERPL-MCNC: 2.9 G/DL (ref 3.5–5)
ALBUMIN/GLOB SERPL: 0.5 (ref 1.1–2.2)
ALP SERPL-CCNC: 232 U/L (ref 45–117)
ALT SERPL-CCNC: 149 U/L (ref 12–78)
ANION GAP SERPL CALC-SCNC: 8 MMOL/L (ref 5–15)
APPEARANCE UR: ABNORMAL
AST SERPL-CCNC: 58 U/L (ref 15–37)
BACTERIA URNS QL MICRO: NEGATIVE /HPF
BASOPHILS # BLD: 0.1 K/UL (ref 0–0.1)
BASOPHILS NFR BLD: 1 % (ref 0–1)
BILIRUB SERPL-MCNC: 0.5 MG/DL (ref 0.2–1)
BILIRUB UR QL: NEGATIVE
BUN SERPL-MCNC: 72 MG/DL (ref 6–20)
BUN/CREAT SERPL: 26 (ref 12–20)
CALCIUM SERPL-MCNC: 9.7 MG/DL (ref 8.5–10.1)
CHLORIDE SERPL-SCNC: 97 MMOL/L (ref 97–108)
CO2 SERPL-SCNC: 20 MMOL/L (ref 21–32)
COLOR UR: ABNORMAL
COMMENT:: NORMAL
CREAT SERPL-MCNC: 2.75 MG/DL (ref 0.7–1.3)
D DIMER PPP FEU-MCNC: 0.75 MG/L FEU (ref 0–0.65)
DIFFERENTIAL METHOD BLD: ABNORMAL
EOSINOPHIL # BLD: 0.1 K/UL (ref 0–0.4)
EOSINOPHIL NFR BLD: 1 % (ref 0–7)
EPITH CASTS URNS QL MICRO: ABNORMAL /LPF
ERYTHROCYTE [DISTWIDTH] IN BLOOD BY AUTOMATED COUNT: 15.9 % (ref 11.5–14.5)
GLOBULIN SER CALC-MCNC: 5.8 G/DL (ref 2–4)
GLUCOSE BLD STRIP.AUTO-MCNC: 127 MG/DL (ref 65–117)
GLUCOSE SERPL-MCNC: 129 MG/DL (ref 65–100)
GLUCOSE UR STRIP.AUTO-MCNC: NEGATIVE MG/DL
HCT VFR BLD AUTO: 43.3 % (ref 36.6–50.3)
HGB BLD-MCNC: 13.5 G/DL (ref 12.1–17)
HGB UR QL STRIP: ABNORMAL
HYALINE CASTS URNS QL MICRO: ABNORMAL /LPF (ref 0–5)
IMM GRANULOCYTES # BLD AUTO: 0.1 K/UL (ref 0–0.04)
IMM GRANULOCYTES NFR BLD AUTO: 1 % (ref 0–0.5)
KETONES UR QL STRIP.AUTO: NEGATIVE MG/DL
LACTATE BLD-SCNC: 0.93 MMOL/L (ref 0.4–2)
LACTATE BLD-SCNC: 2.42 MMOL/L (ref 0.4–2)
LEUKOCYTE ESTERASE UR QL STRIP.AUTO: ABNORMAL
LYMPHOCYTES # BLD: 1.5 K/UL (ref 0.8–3.5)
LYMPHOCYTES NFR BLD: 11 % (ref 12–49)
MCH RBC QN AUTO: 25.3 PG (ref 26–34)
MCHC RBC AUTO-ENTMCNC: 31.2 G/DL (ref 30–36.5)
MCV RBC AUTO: 81.1 FL (ref 80–99)
MONOCYTES # BLD: 0.8 K/UL (ref 0–1)
MONOCYTES NFR BLD: 6 % (ref 5–13)
NEUTS SEG # BLD: 10.8 K/UL (ref 1.8–8)
NEUTS SEG NFR BLD: 80 % (ref 32–75)
NITRITE UR QL STRIP.AUTO: NEGATIVE
NRBC # BLD: 0 K/UL (ref 0–0.01)
NRBC BLD-RTO: 0 PER 100 WBC
NT PRO BNP: 107 PG/ML
PH UR STRIP: 5 (ref 5–8)
PLATELET # BLD AUTO: 809 K/UL (ref 150–400)
PMV BLD AUTO: 9.5 FL (ref 8.9–12.9)
POTASSIUM SERPL-SCNC: 5.1 MMOL/L (ref 3.5–5.1)
PROCALCITONIN SERPL-MCNC: 0.25 NG/ML
PROT SERPL-MCNC: 8.7 G/DL (ref 6.4–8.2)
PROT UR STRIP-MCNC: ABNORMAL MG/DL
RBC # BLD AUTO: 5.34 M/UL (ref 4.1–5.7)
RBC #/AREA URNS HPF: ABNORMAL /HPF (ref 0–5)
RBC MORPH BLD: ABNORMAL
SERVICE CMNT-IMP: ABNORMAL
SODIUM SERPL-SCNC: 125 MMOL/L (ref 136–145)
SP GR UR REFRACTOMETRY: 1.02 (ref 1–1.03)
SPECIMEN HOLD: NORMAL
TROPONIN I SERPL HS-MCNC: 4 NG/L (ref 0–76)
URINE CULTURE IF INDICATED: ABNORMAL
UROBILINOGEN UR QL STRIP.AUTO: 0.2 EU/DL (ref 0.2–1)
WBC # BLD AUTO: 13.4 K/UL (ref 4.1–11.1)
WBC URNS QL MICRO: ABNORMAL /HPF (ref 0–4)

## 2023-05-20 PROCEDURE — 83880 ASSAY OF NATRIURETIC PEPTIDE: CPT

## 2023-05-20 PROCEDURE — 96361 HYDRATE IV INFUSION ADD-ON: CPT

## 2023-05-20 PROCEDURE — 81001 URINALYSIS AUTO W/SCOPE: CPT

## 2023-05-20 PROCEDURE — 87040 BLOOD CULTURE FOR BACTERIA: CPT

## 2023-05-20 PROCEDURE — 6360000002 HC RX W HCPCS: Performed by: SURGERY

## 2023-05-20 PROCEDURE — 82962 GLUCOSE BLOOD TEST: CPT

## 2023-05-20 PROCEDURE — 84484 ASSAY OF TROPONIN QUANT: CPT

## 2023-05-20 PROCEDURE — 96375 TX/PRO/DX INJ NEW DRUG ADDON: CPT

## 2023-05-20 PROCEDURE — 74176 CT ABD & PELVIS W/O CONTRAST: CPT

## 2023-05-20 PROCEDURE — 1100000000 HC RM PRIVATE

## 2023-05-20 PROCEDURE — 36415 COLL VENOUS BLD VENIPUNCTURE: CPT

## 2023-05-20 PROCEDURE — 85025 COMPLETE CBC W/AUTO DIFF WBC: CPT

## 2023-05-20 PROCEDURE — 96365 THER/PROPH/DIAG IV INF INIT: CPT

## 2023-05-20 PROCEDURE — 83605 ASSAY OF LACTIC ACID: CPT

## 2023-05-20 PROCEDURE — 2580000003 HC RX 258: Performed by: SURGERY

## 2023-05-20 PROCEDURE — 6360000002 HC RX W HCPCS: Performed by: STUDENT IN AN ORGANIZED HEALTH CARE EDUCATION/TRAINING PROGRAM

## 2023-05-20 PROCEDURE — 99285 EMERGENCY DEPT VISIT HI MDM: CPT

## 2023-05-20 PROCEDURE — 85379 FIBRIN DEGRADATION QUANT: CPT

## 2023-05-20 PROCEDURE — 84145 PROCALCITONIN (PCT): CPT

## 2023-05-20 PROCEDURE — 2580000003 HC RX 258: Performed by: STUDENT IN AN ORGANIZED HEALTH CARE EDUCATION/TRAINING PROGRAM

## 2023-05-20 PROCEDURE — 80053 COMPREHEN METABOLIC PANEL: CPT

## 2023-05-20 RX ORDER — 0.9 % SODIUM CHLORIDE 0.9 %
1000 INTRAVENOUS SOLUTION INTRAVENOUS ONCE
Status: COMPLETED | OUTPATIENT
Start: 2023-05-20 | End: 2023-05-20

## 2023-05-20 RX ADMIN — PIPERACILLIN AND TAZOBACTAM 4500 MG: 4; .5 INJECTION, POWDER, LYOPHILIZED, FOR SOLUTION INTRAVENOUS at 22:05

## 2023-05-20 RX ADMIN — CEFEPIME 2000 MG: 2 INJECTION, POWDER, FOR SOLUTION INTRAVENOUS at 17:45

## 2023-05-20 RX ADMIN — SODIUM CHLORIDE 1000 ML: 9 INJECTION, SOLUTION INTRAVENOUS at 15:42

## 2023-05-20 RX ADMIN — Medication 2500 MG: at 18:15

## 2023-05-20 ASSESSMENT — ENCOUNTER SYMPTOMS
VOMITING: 0
BACK PAIN: 0
ABDOMINAL PAIN: 0
NAUSEA: 0
SHORTNESS OF BREATH: 0

## 2023-05-21 LAB
ANION GAP SERPL CALC-SCNC: 4 MMOL/L (ref 5–15)
BUN SERPL-MCNC: 53 MG/DL (ref 6–20)
BUN/CREAT SERPL: 29 (ref 12–20)
CALCIUM SERPL-MCNC: 8.7 MG/DL (ref 8.5–10.1)
CHLORIDE SERPL-SCNC: 105 MMOL/L (ref 97–108)
CO2 SERPL-SCNC: 24 MMOL/L (ref 21–32)
CREAT SERPL-MCNC: 1.83 MG/DL (ref 0.7–1.3)
GLUCOSE SERPL-MCNC: 138 MG/DL (ref 65–100)
POTASSIUM SERPL-SCNC: 5.1 MMOL/L (ref 3.5–5.1)
SODIUM SERPL-SCNC: 133 MMOL/L (ref 136–145)
VANCOMYCIN SERPL-MCNC: 12.6 UG/ML

## 2023-05-21 PROCEDURE — 6370000000 HC RX 637 (ALT 250 FOR IP): Performed by: SURGERY

## 2023-05-21 PROCEDURE — 36415 COLL VENOUS BLD VENIPUNCTURE: CPT

## 2023-05-21 PROCEDURE — 80048 BASIC METABOLIC PNL TOTAL CA: CPT

## 2023-05-21 PROCEDURE — 80202 ASSAY OF VANCOMYCIN: CPT

## 2023-05-21 PROCEDURE — 1100000000 HC RM PRIVATE

## 2023-05-21 PROCEDURE — 6360000002 HC RX W HCPCS: Performed by: SURGERY

## 2023-05-21 PROCEDURE — 2580000003 HC RX 258: Performed by: SURGERY

## 2023-05-21 RX ORDER — MORPHINE SULFATE 2 MG/ML
2 INJECTION, SOLUTION INTRAMUSCULAR; INTRAVENOUS
Status: DISCONTINUED | OUTPATIENT
Start: 2023-05-21 | End: 2023-05-23 | Stop reason: HOSPADM

## 2023-05-21 RX ORDER — TAMSULOSIN HYDROCHLORIDE 0.4 MG/1
0.4 CAPSULE ORAL 2 TIMES DAILY
Status: DISCONTINUED | OUTPATIENT
Start: 2023-05-21 | End: 2023-05-23 | Stop reason: HOSPADM

## 2023-05-21 RX ORDER — METOPROLOL TARTRATE 50 MG/1
50 TABLET, FILM COATED ORAL 2 TIMES DAILY
Status: DISCONTINUED | OUTPATIENT
Start: 2023-05-21 | End: 2023-05-23 | Stop reason: HOSPADM

## 2023-05-21 RX ORDER — ONDANSETRON 2 MG/ML
4 INJECTION INTRAMUSCULAR; INTRAVENOUS EVERY 6 HOURS PRN
Status: DISCONTINUED | OUTPATIENT
Start: 2023-05-21 | End: 2023-05-23 | Stop reason: HOSPADM

## 2023-05-21 RX ORDER — PRAVASTATIN SODIUM 20 MG
20 TABLET ORAL NIGHTLY
Status: DISCONTINUED | OUTPATIENT
Start: 2023-05-21 | End: 2023-05-23 | Stop reason: HOSPADM

## 2023-05-21 RX ORDER — ACETAMINOPHEN 325 MG/1
650 TABLET ORAL EVERY 4 HOURS PRN
Status: DISCONTINUED | OUTPATIENT
Start: 2023-05-21 | End: 2023-05-23 | Stop reason: HOSPADM

## 2023-05-21 RX ORDER — SODIUM CHLORIDE 9 MG/ML
INJECTION, SOLUTION INTRAVENOUS PRN
Status: DISCONTINUED | OUTPATIENT
Start: 2023-05-21 | End: 2023-05-23 | Stop reason: HOSPADM

## 2023-05-21 RX ORDER — SODIUM CHLORIDE, SODIUM LACTATE, POTASSIUM CHLORIDE, CALCIUM CHLORIDE 600; 310; 30; 20 MG/100ML; MG/100ML; MG/100ML; MG/100ML
INJECTION, SOLUTION INTRAVENOUS CONTINUOUS
Status: DISCONTINUED | OUTPATIENT
Start: 2023-05-21 | End: 2023-05-23 | Stop reason: HOSPADM

## 2023-05-21 RX ORDER — SODIUM CHLORIDE 0.9 % (FLUSH) 0.9 %
5-40 SYRINGE (ML) INJECTION PRN
Status: DISCONTINUED | OUTPATIENT
Start: 2023-05-21 | End: 2023-05-23 | Stop reason: HOSPADM

## 2023-05-21 RX ORDER — ONDANSETRON 4 MG/1
4 TABLET, ORALLY DISINTEGRATING ORAL EVERY 8 HOURS PRN
Status: DISCONTINUED | OUTPATIENT
Start: 2023-05-21 | End: 2023-05-23 | Stop reason: HOSPADM

## 2023-05-21 RX ORDER — SODIUM CHLORIDE 0.9 % (FLUSH) 0.9 %
5-40 SYRINGE (ML) INJECTION EVERY 12 HOURS SCHEDULED
Status: DISCONTINUED | OUTPATIENT
Start: 2023-05-21 | End: 2023-05-23 | Stop reason: HOSPADM

## 2023-05-21 RX ADMIN — TAMSULOSIN HYDROCHLORIDE 0.4 MG: 0.4 CAPSULE ORAL at 20:42

## 2023-05-21 RX ADMIN — PIPERACILLIN AND TAZOBACTAM 3375 MG: 3; .375 INJECTION, POWDER, LYOPHILIZED, FOR SOLUTION INTRAVENOUS at 18:40

## 2023-05-21 RX ADMIN — SODIUM CHLORIDE, POTASSIUM CHLORIDE, SODIUM LACTATE AND CALCIUM CHLORIDE: 600; 310; 30; 20 INJECTION, SOLUTION INTRAVENOUS at 05:23

## 2023-05-21 RX ADMIN — PIPERACILLIN AND TAZOBACTAM 3375 MG: 3; .375 INJECTION, POWDER, LYOPHILIZED, FOR SOLUTION INTRAVENOUS at 10:06

## 2023-05-21 RX ADMIN — PRAVASTATIN SODIUM 20 MG: 20 TABLET ORAL at 20:42

## 2023-05-21 RX ADMIN — SODIUM CHLORIDE, PRESERVATIVE FREE 10 ML: 5 INJECTION INTRAVENOUS at 10:06

## 2023-05-21 RX ADMIN — VANCOMYCIN HYDROCHLORIDE 1000 MG: 1 INJECTION, POWDER, LYOPHILIZED, FOR SOLUTION INTRAVENOUS at 23:49

## 2023-05-21 RX ADMIN — SODIUM CHLORIDE, POTASSIUM CHLORIDE, SODIUM LACTATE AND CALCIUM CHLORIDE: 600; 310; 30; 20 INJECTION, SOLUTION INTRAVENOUS at 18:47

## 2023-05-21 RX ADMIN — METOPROLOL TARTRATE 50 MG: 50 TABLET ORAL at 20:42

## 2023-05-21 RX ADMIN — SODIUM CHLORIDE, PRESERVATIVE FREE 10 ML: 5 INJECTION INTRAVENOUS at 20:43

## 2023-05-21 RX ADMIN — PIPERACILLIN AND TAZOBACTAM 3375 MG: 3; .375 INJECTION, POWDER, LYOPHILIZED, FOR SOLUTION INTRAVENOUS at 01:43

## 2023-05-21 RX ADMIN — TAMSULOSIN HYDROCHLORIDE 0.4 MG: 0.4 CAPSULE ORAL at 10:06

## 2023-05-21 RX ADMIN — METOPROLOL TARTRATE 50 MG: 50 TABLET ORAL at 10:06

## 2023-05-22 PROCEDURE — 2580000003 HC RX 258: Performed by: SURGERY

## 2023-05-22 PROCEDURE — 6370000000 HC RX 637 (ALT 250 FOR IP): Performed by: SURGERY

## 2023-05-22 PROCEDURE — 1100000000 HC RM PRIVATE

## 2023-05-22 PROCEDURE — 6360000002 HC RX W HCPCS: Performed by: SURGERY

## 2023-05-22 RX ADMIN — VANCOMYCIN HYDROCHLORIDE 1000 MG: 1 INJECTION, POWDER, LYOPHILIZED, FOR SOLUTION INTRAVENOUS at 23:35

## 2023-05-22 RX ADMIN — PIPERACILLIN AND TAZOBACTAM 3375 MG: 3; .375 INJECTION, POWDER, LYOPHILIZED, FOR SOLUTION INTRAVENOUS at 09:11

## 2023-05-22 RX ADMIN — TAMSULOSIN HYDROCHLORIDE 0.4 MG: 0.4 CAPSULE ORAL at 09:08

## 2023-05-22 RX ADMIN — PIPERACILLIN AND TAZOBACTAM 3375 MG: 3; .375 INJECTION, POWDER, LYOPHILIZED, FOR SOLUTION INTRAVENOUS at 03:18

## 2023-05-22 RX ADMIN — TAMSULOSIN HYDROCHLORIDE 0.4 MG: 0.4 CAPSULE ORAL at 20:58

## 2023-05-22 RX ADMIN — PRAVASTATIN SODIUM 20 MG: 20 TABLET ORAL at 20:58

## 2023-05-22 RX ADMIN — SODIUM CHLORIDE, PRESERVATIVE FREE 10 ML: 5 INJECTION INTRAVENOUS at 20:59

## 2023-05-22 RX ADMIN — PIPERACILLIN AND TAZOBACTAM 3375 MG: 3; .375 INJECTION, POWDER, LYOPHILIZED, FOR SOLUTION INTRAVENOUS at 18:19

## 2023-05-22 RX ADMIN — SODIUM CHLORIDE, PRESERVATIVE FREE 10 ML: 5 INJECTION INTRAVENOUS at 09:10

## 2023-05-22 RX ADMIN — METOPROLOL TARTRATE 50 MG: 50 TABLET ORAL at 09:08

## 2023-05-22 RX ADMIN — METOPROLOL TARTRATE 50 MG: 50 TABLET ORAL at 20:59

## 2023-05-22 NOTE — PROGRESS NOTES
Comprehensive Nutrition Assessment    Type and Reason for Visit: Initial, Consult    Nutrition Recommendations/Plan:   Continue current Regular diet  Added Ensure HP BID for added protein  Added HS snack       Malnutrition Assessment:  Malnutrition Status: Moderate malnutrition (05/22/23 1448)    Context:  Acute Illness     Findings of the 6 clinical characteristics of malnutrition:  Energy Intake:  75% or less of estimated energy requirements for 7 or more days  Weight Loss:  5% over 1 month     Body Fat Loss:  No significant body fat loss     Muscle Mass Loss:  No significant muscle mass loss    Fluid Accumulation:  No significant fluid accumulation     Strength:  Not Performed       Nutrition Assessment:    Past medical hx:       Diagnosis Date    Anemia     At risk for sleep apnea 03/25/2019    per phone assessment for pat on 03/25/2019    CAD (coronary artery disease)     Essential hypertension     History of ileostomy 2003    for UC s/p total proctocolectomy    Hyperlipidemia     Ulcerative colitis ([facility] [address] [address]     s/p total proctocolectomy with end ileostomy 2003     Pt and spouse report poor PO x ~1 week PTA. Pt states weight prior to recent surgery May 2 was 240 lbs and states current weight of 217 lbs. This is 23 lbs wt loss over last ~1-2 weeks 2/2 poor PO as appetite had been poor. Pt reports appetite has returned and feeling better. Pt ate >75% of breakfast and lunch. Ostomy functioning. No reported chew/swallow difficulties, no n/v, no pain reported. Current Nutrition Therapies:  ADULT DIET; Regular  ADULT ORAL NUTRITION SUPPLEMENT; Breakfast, Dinner; Low Calorie/High Protein Oral Supplement  DIET ONE TIME MESSAGE;  Meal Intake:   No data found. Supplement Intake:  No data found.   Nutrition Support: n/a    Nutritionally Significant Medications:  Scheduled Meds:   [START ON 5/23/2023] vancomycin (VANCOCIN) intermittent dosing (placeholder)   Other Once    metoprolol tartrate  50 mg Oral BID

## 2023-05-22 NOTE — CARE COORDINATION
05/22/23 1344   Service Assessment   Patient Orientation Alert and Oriented   Cognition Alert   History Provided By Patient;Spouse   Primary Caregiver Self   Accompanied By/Relationship Wife   Support Systems Spouse/Significant Other;Children;Family Members;Friends/Neighbors   Patient's Healthcare Decision Maker is: Named in [facility]  (Pt states he has AMD on file)   PCP Verified by CM Yes   Last Visit to PCP Within last 3 months   Prior Functional Level Independent in ADLs/IADLs   Current Functional Level Independent in ADLs/IADLs   Can patient return to prior living arrangement Yes   Ability to make needs known: Good   Family able to assist with home care needs: Yes   Would you like for me to discuss the discharge plan with any other family members/significant others, and if so, who? Yes  (Wife if needed)   Financial Resources None   Community Resources None   Social/Functional History   Lives With Spouse   Type of [address] One level   Home Equipment Walker, [address] Help From Family;Friend(s)   ADL Assistance Independent   Homemaking Assistance Independent   Ambulation Assistance Independent   Transfer Assistance Independent   Active  Yes   Mode of Transportation Car   Discharge Planning   Living Arrangements Spouse/Significant Other   Current Services Prior To Admission None   Potential [address] Medications No   Patient expects to be discharged to: [address] Discharge   Transition of [address] (CM Consult) [address] No   Reason Outside [address] Physician referred to [address] [facility] Discharge PT;Nursing services   [address] Provided?  No   Mode of Transport at Discharge Self   Confirm Follow Up Transport Family   Condition of Participation: Discharge Planning   The Patient and/or Patient

## 2023-05-22 NOTE — PROGRESS NOTES
General Surgery Shift Note    Activity:  Activity Level:  ad abdi  Number times ambulated in hallways past shift: multiple  Number of times OOB to chair past shift: multiple      Vitals:  Patient Vitals for the past 12 hrs:   Temp Pulse Resp BP SpO2   05/22/23 0719 97.7 °F (36.5 °C) 85 18 (!) 163/74 95 %   05/22/23 0530 97.8 °F (36.6 °C) 73 18 (!) 154/76 97 %   05/22/23 0014 98.2 °F (36.8 °C) 75 18 (!) 150/77 98 %          Intake:  [unfilled]         Output by Drain (mL) 05/20/23 0701 - 05/20/23 1900 05/20/23 1901 - 05/21/23 0700 05/21/23 0701 - 05/21/23 1900 05/21/23 1901 - 05/22/23 0700 05/22/23 0701 - 05/22/23 0757   Requested LDAs do not have output data documented. Genitourinary:   Urinary status: Patient is voiding without difficulty. GI:  Last Bowel Movement Date: N/A  Current diet: bariatric  Tolerating current diet: yes    Passing flatus: (yes  % Diet Eaten: gait and coordination normal and speech normal  Nausea and Vomiting- Yes or No        Bedside shift change report given to Aleks Evans (oncoming nurse) by (RN name self populates)Kaylin (offgoing nurse).   Report included the following information (see hand over

## 2023-05-22 NOTE — PROGRESS NOTES
[facility] Dosing Services: Antimicrobial Stewardship Daily Doc  Consult for antibiotic dosing of Vancomycin by Dr. Carmen Han  Indication: post-op wound infection  Day of Therapy: 3    Ht Readings from Last 1 Encounters:   05/20/23 1.778 m (5' 10\")        Wt Readings from Last 1 Encounters:   05/20/23 98.4 kg (217 lb)      Vancomycin therapy:  Loading dose: Vancomycin 2500 mg x1 dose given 05/20 at 1815  Maintenance dose: Vancomycin 1000 mg IV every 24 hours   Dose calculated to approximate a           a. Target AUC/PILLO of 400-600          b. Trough of N/A    Plan: No new labs to assess today, will order Scr every other day starting tomorrow x 3 per protocol. Will order level tomorrow AM to coincide with Scr to better assess current regimen. Will follow renal function closely for adjustments. Other Antimicrobial   (not dosed by pharmacist) Zosyn 3.375 gm IV every 8 hours   Cultures  05/20 Blood x2: NGTD x 2 days   Serum Creatinine Lab Results   Component Value Date/Time    CREATININE 2.75 05/20/2023 03:31 PM          Creatinine Clearance Estimated Creatinine Clearance: 26 mL/min (A) (based on SCr of 2.75 mg/dL (H)).      Temp Temp: 97.8 °F (36.6 °C) (Oral)       WBC Lab Results   Component Value Date/Time    WBC 13.4 05/20/2023 03:31 PM          Procalcitonin No results found for: PCT     For Antifungals, Metronidazole and Nafcillin: Lab Results   Component Value Date/Time     05/20/2023 03:31 PM    AST 58 05/20/2023 03:31 PM        Pharmacist: Fazal Bishop, [address] [place] [address]

## 2023-05-22 NOTE — PROGRESS NOTES
SURGERY PROGRESS NOTE      Admit Date: 2023    POD * No surgery found *    Procedure: * No surgery found *      Subjective:   Feeling much better  Appetite has returned      Objective:     /75   Pulse 97   Temp 98.1 °F (36.7 °C) (Oral)   Resp 16   Ht 1.778 m (5' 10\")   Wt 98.4 kg (217 lb)   SpO2 98%   BMI 31.14 kg/m²      Temp (24hrs), Av °F (36.7 °C), Min:97.7 °F (36.5 °C), Max:98.3 °F (36.8 °C)      Physical Exam:     Abdomen:  Soft. Non-tender, non-distended. Incision with necrosis at the umbilicus with sm volume fat necrosis          Assessment:     Principal Problem:    Wound infection after surgery  Resolved Problems:    * No resolved hospital problems.  *      Plan/Recommendations/Medical Decision Making:   Continue IV abx  OOB as able  Diet as tolerates  Anticipate home tomorrow

## 2023-05-22 NOTE — CARE COORDINATION
1:44 PM- CM attempted to meet with pt- pt working with providers. CM will follow up shortly.     Chad Boyce, MSW, [address]

## 2023-05-22 NOTE — PLAN OF CARE
Problem: Discharge Planning  Goal: Discharge to home or other facility with appropriate resources  Outcome: Completed     Problem: ABCDS Injury Assessment  Goal: Absence of physical injury  Outcome: Completed     Problem: Safety - Adult  Goal: Free from fall injury  Outcome: Completed

## 2023-05-23 VITALS
HEART RATE: 72 BPM | OXYGEN SATURATION: 98 % | DIASTOLIC BLOOD PRESSURE: 66 MMHG | TEMPERATURE: 97.3 F | RESPIRATION RATE: 16 BRPM | WEIGHT: 217 LBS | BODY MASS INDEX: 31.07 KG/M2 | HEIGHT: 70 IN | SYSTOLIC BLOOD PRESSURE: 125 MMHG

## 2023-05-23 LAB
CREAT SERPL-MCNC: 1.33 MG/DL (ref 0.7–1.3)
VANCOMYCIN SERPL-MCNC: 17 UG/ML

## 2023-05-23 PROCEDURE — 82565 ASSAY OF CREATININE: CPT

## 2023-05-23 PROCEDURE — 80202 ASSAY OF VANCOMYCIN: CPT

## 2023-05-23 PROCEDURE — 36415 COLL VENOUS BLD VENIPUNCTURE: CPT

## 2023-05-23 PROCEDURE — 6360000002 HC RX W HCPCS: Performed by: SURGERY

## 2023-05-23 PROCEDURE — 6370000000 HC RX 637 (ALT 250 FOR IP): Performed by: SURGERY

## 2023-05-23 PROCEDURE — 2580000003 HC RX 258: Performed by: SURGERY

## 2023-05-23 RX ADMIN — METOPROLOL TARTRATE 50 MG: 50 TABLET ORAL at 09:00

## 2023-05-23 RX ADMIN — SODIUM CHLORIDE, PRESERVATIVE FREE 10 ML: 5 INJECTION INTRAVENOUS at 09:14

## 2023-05-23 RX ADMIN — PIPERACILLIN AND TAZOBACTAM 3375 MG: 3; .375 INJECTION, POWDER, LYOPHILIZED, FOR SOLUTION INTRAVENOUS at 02:14

## 2023-05-23 RX ADMIN — TAMSULOSIN HYDROCHLORIDE 0.4 MG: 0.4 CAPSULE ORAL at 09:00

## 2023-05-23 RX ADMIN — PIPERACILLIN AND TAZOBACTAM 3375 MG: 3; .375 INJECTION, POWDER, LYOPHILIZED, FOR SOLUTION INTRAVENOUS at 09:02

## 2023-05-23 NOTE — PROGRESS NOTES
[facility] Dosing Services: Antimicrobial Stewardship Daily Doc  Consult for antibiotic dosing of Vancomycin by Dr. Adalid Yoo  Indication: post-op wound infection  Day of Therapy: 4    Ht Readings from Last 1 Encounters:   05/22/23 1.778 m (5' 10\")        Wt Readings from Last 1 Encounters:   05/20/23 98.4 kg (217 lb)      Vancomycin therapy:  Maintenance dose: Vancomycin 1000 mg IV every 24 hours   Dose calculated to approximate a           a. Target AUC/PILLO of 400-600          b. Trough of N/A  Last Level: 17.0 05/23 at 06:11, drawn ~6.5 hours after last dose, predicts subtherapeutic . Plan: Patient's renal function with great improvement since admission. Scr today at 1.33, trending down, will continue with maintenance dosing. Level subtherapeutic today, will increase to Vancomycin 1250 mg IV every 24 hours. Consult ordered for 5 days, will enter 5 day stop for tomorrow. Other Antimicrobial   (not dosed by pharmacist) Zosyn 3.375 gm IV every 8 hours   Cultures  05/20 Blood x2: NGTD x 3 days   Serum Creatinine Lab Results   Component Value Date/Time    CREATININE 1.33 05/23/2023 06:11 AM          Creatinine Clearance Estimated Creatinine Clearance: 53 mL/min (A) (based on SCr of 1.33 mg/dL (H)).      Temp Temp: 97.3 °F (36.3 °C) (Oral)       WBC Lab Results   Component Value Date/Time    WBC 13.4 05/20/2023 03:31 PM          Procalcitonin No results found for: PCT     For Antifungals, Metronidazole and Nafcillin: Lab Results   Component Value Date/Time     05/20/2023 03:31 PM    AST 58 05/20/2023 03:31 PM        Pharmacist: Dayron Khanna, [address] [place] [address]

## 2023-05-23 NOTE — CARE COORDINATION
5/23/2023 11:46 AM Discharge order noted. At [address] was sent pt's discharge clinicals and notified of pt's discharge home today via [employer]. Family to discharge pt home. Peter Zapien, [address]       05/23/23 1142   Services At/After Discharge   Transition of Care Consult (CM Consult) [facility] No   Reason Outside Agency Chosen Patient already serviced by other home [address] Provided?  No   Mode of Transport at Discharge Other (see comment)  (Family)

## 2023-05-23 NOTE — PROGRESS NOTES
Physician Progress Note      PATIENT:               Lali Mercer  CSN #:                  [id number]  :                       1945  ADMIT DATE:       2023 3:26 PM  [address] [place] DATE:        2023 12:41 PM  RESPONDING  PROVIDER #:        Katie Matias MD          QUERY TEXT:    Good afternoon. Patient admitted with post-op wound infection and sepsis. Noted to have   moderate malnutrition documented in 23 Registered Dietician note. If possible, please document in progress notes and discharge summary if you   were evaluating and /or treating any of the following: The medical record reflects the following:    Risk Factors: Recent ileostomy and repair of hernia, Post-op wound infections,   HTN, CAD    Clinical Indicators: from  note: \"Malnutrition Status: Moderate   malnutrition (23 1448) Context:  Acute Illness  Findings of the 6 clinical characteristics of malnutrition: Energy Intake:    75% or less of estimated energy requirements for 7 or more days Weight Loss:    5% over 1 month Body Fat Loss:  No significant body fat loss Muscle Mass Loss:    No significant muscle mass loss Fluid Accumulation:  No significant fluid   accumulation  Strength:  Not Performed\"; albumin 2.9; Sodium 123, 133;   Crea 2.75m 1.83, 1.33; hgb 11.0, 13.5; hct 35.0, 4.3    Treatment: RD consult, Oral nutritional supplement, labs, monitor      Thank you,  Haseeb Rg RN, CDI  ______________________________________________________________________________  _________________  Gabbie Favor Criteria:    https://aspenjournals. onlinelibrary. pinon. com/doi/full/10.1177/139599813250855  5  Options provided:  -- Protein calorie malnutrition mild  -- Protein calorie malnutrition moderate  -- Other - I will add my own diagnosis  -- Disagree - Not applicable / Not valid  -- Disagree - Clinically unable to determine / Unknown  -- Refer to Clinical Documentation Reviewer    PROVIDER RESPONSE TEXT:    This

## 2023-05-23 NOTE — PROGRESS NOTES
Physician Progress Note      PATIENT:               Starla Ying  CSN #:                  [id number]  :                       1945  ADMIT DATE:       2023 3:26 PM  [address] [place] DATE:        2023 12:41 PM  RESPONDING  PROVIDER #:        Alondra Clinton MD          QUERY TEXT:    Good afternoon. Pt admitted with post-op wound infection. Pt noted to have Sepsis per DC   summary and elevated serum creatinine. If possible, please further clarify the relationship, if any, between Sepsis   and elevated serum creatinine. The medical record reflects the following:    Risk Factors: HTN, CAD, recent ileostomy and repair of hernia with post-op   infection    Clinical Indicators: Creatinine 2.75, 1.83, 1.33; BUN 72, 53; GFR 23, 37, 54;   Vitals on arrival , BP 88/54    Treatment: Serial labs, IV fluids, monitor      Thank you,  Karyle La, RN, CDI  Options provided:  -- Acute organ dysfunction of elevated serum creatinine is associated with   sepsis, POA  -- Acute organ dysfunction of elevated serum creatinine is unrelated to sepsis  -- Other - I will add my own diagnosis  -- Disagree - Not applicable / Not valid  -- Disagree - Clinically unable to determine / Unknown  -- Refer to Clinical Documentation Reviewer    PROVIDER RESPONSE TEXT:    Provider disagreed with this query.   no sepsis pt was dehydrated from lack of PO intake    Query created by: Ella Valetnin on 2023 1:57 PM      Electronically signed by:  Alondra Clinton MD 2023 2:20 PM

## 2023-05-23 NOTE — DISCHARGE SUMMARY
Discharge Summary    Patient: Ladi Reyes               Sex: male          DOA: [unfilled]       YOB: 1945      Age:  76 y.o.        LOS:  LOS: 3 days                Admit Date: 5/20/2023    Discharge Date: 5/23/2023    Admission Diagnoses: Hyponatremia [E87.1]  Elevated serum creatinine [R79.89]  Wound infection after surgery [T81.49XA]  Draining postoperative wound, initial encounter [T81.89XA]  Sepsis, due to unspecified organism, unspecified whether acute organ dysfunction present [facility]) [A41.9]    Discharge Diagnoses:  wound infection    Discharge Condition: [address] Course: Pt admitted with concerns over wound care at home as well as dehydration. On discharge he is eating and drinking well, wound is improving.       Gen: NAD  Resp: aerating well no distress  Abd: soft, wound packed with hydrofera blue, no odor, mild drainage, necrosis of skin at umbilicus  Consults: None    Significant Diagnostic Studies: CT abdomen    Discharge Medications:     Current Discharge Medication List        CONTINUE these medications which have NOT CHANGED    Details   Multiple Vitamin (MULTIVITAMIN PO) Take 1 tablet by mouth daily      amoxicillin-clavulanate (AUGMENTIN) 875-125 MG per tablet Take 1 tablet by mouth 2 times daily      carbonyl iron (FEOSOL) 45 MG TABS Take 1 tablet by mouth daily      Cholecalciferol 50 MCG (2000 UT) TABS Take 1 tablet by mouth daily      losartan (COZAAR) 100 MG tablet Take by mouth every evening      metoprolol tartrate (LOPRESSOR) 50 MG tablet TAKE 1 TABLET BY MOUTH TWICE DAILY      nitroGLYCERIN (NITROSTAT) 0.4 MG SL tablet Place under the tongue      pravastatin (PRAVACHOL) 80 MG tablet Take by mouth      tamsulosin (FLOMAX) 0.4 MG capsule Take by mouth 2 times daily             Activity: activity as tolerated    Diet: regular diet    Wound Care: pack with hydrofera blue every 2-3 days cover with clean dry gauze that is changed daily    Follow-up: 1 week

## 2023-05-23 NOTE — PROGRESS NOTES
Discharge instructions presented to patient with his wife present. All questions and concerns addressed appropriately. New medications were read and explained to patient, patient verbalized understanding. Patient aware that prescriptions have been electronically sent to their pharmacy. All IV's removed. Patient belongings packed up and with patient. Patient awaiting transport via wheelchair by volunteer services to the main entrance after eating lunch.

## 2023-05-24 LAB
EKG ATRIAL RATE: 83 BPM
EKG DIAGNOSIS: NORMAL
EKG P AXIS: 22 DEGREES
EKG P-R INTERVAL: 172 MS
EKG Q-T INTERVAL: 364 MS
EKG QRS DURATION: 88 MS
EKG QTC CALCULATION (BAZETT): 427 MS
EKG R AXIS: -28 DEGREES
EKG T AXIS: 36 DEGREES
EKG VENTRICULAR RATE: 83 BPM

## 2023-05-24 NOTE — PROGRESS NOTES
Physician Progress Note      PATIENT:               Graciela Baptiste  CSN #:                  [id number]  :                       1945  ADMIT DATE:       2023 3:26 PM  [address] DATE:        2023 12:41 PM  RESPONDING  PROVIDER #:        Marcela Rush MD          QUERY TEXT:    Good afternoon. Pt admitted with recent ileostomy and repair of hernia and post-op wound   infection and sepsis. Pt noted to have elevated serum creatinine of 2.75 on   arrival which improved to 1.33 at discharge. If possible, please clarify if you were treating: The medical record reflects the following:    Risk Factors: HTN, CAD, recent ileostomy and repair of hernia with post-op   wound infection and sepsis    Clinical Indicators: Creatinine 2.75, 1.83, 1.33; BUN 72, 53; GFR 23, 37, 54;   from DC summary dated : \"Elevated serum creatinine\"    Treatment: Serial labs, IV fluids, monitoring      Thank you,  Maryam Grace RN, CDI  ______________________________________________________________________________  _______________________  Defined by Kidney Disease Improving Global Outcomes (KDIGO) clinical practice   guideline for acute kidney injury:  -Increase in SCr by greater than or equal to 0.3 mg/dl within 48 hours; or  -Increase or decrease in SCr to greater than or equal to 1.5 times baseline,   which is known or presumed to have occurred within the prior 7 days; or  -Urine volume < 0.5ml/kg/h for 6 hours  Options provided:  -- Acute kidney injury  -- Other - I will add my own diagnosis  -- Disagree - Not applicable / Not valid  -- Disagree - Clinically unable to determine / Unknown  -- Refer to Clinical Documentation Reviewer    PROVIDER RESPONSE TEXT:    This patient has an Acute kidney injury.     Query created by: Neo Ordaz on 2023 2:27 PM      Electronically signed by:  Marcela Rush MD 2023 9:40 AM

## 2023-05-26 LAB
BACTERIA SPEC CULT: NORMAL
BACTERIA SPEC CULT: NORMAL
SERVICE CMNT-IMP: NORMAL
SERVICE CMNT-IMP: NORMAL

## 2023-10-13 ENCOUNTER — APPOINTMENT (OUTPATIENT)
Facility: HOSPITAL | Age: 78
DRG: 684 | End: 2023-10-13
Payer: MEDICARE

## 2023-10-13 ENCOUNTER — HOSPITAL ENCOUNTER (INPATIENT)
Facility: HOSPITAL | Age: 78
LOS: 4 days | Discharge: HOME | DRG: 684 | End: 2023-10-17
Attending: EMERGENCY MEDICINE | Admitting: HOSPITALIST
Payer: MEDICARE

## 2023-10-13 DIAGNOSIS — R06.09 DYSPNEA ON EXERTION: Primary | ICD-10-CM

## 2023-10-13 DIAGNOSIS — R42 LIGHT HEADEDNESS: ICD-10-CM

## 2023-10-13 DIAGNOSIS — N17.9 ACUTE KIDNEY INJURY (HCC): ICD-10-CM

## 2023-10-13 LAB
ALBUMIN SERPL-MCNC: 3.7 G/DL (ref 3.5–5)
ALBUMIN/GLOB SERPL: 0.7 (ref 1.1–2.2)
ALP SERPL-CCNC: 87 U/L (ref 45–117)
ALT SERPL-CCNC: 64 U/L (ref 12–78)
ANION GAP SERPL CALC-SCNC: 10 MMOL/L (ref 5–15)
APPEARANCE UR: CLEAR
AST SERPL-CCNC: 45 U/L (ref 15–37)
BACTERIA URNS QL MICRO: NEGATIVE /HPF
BASOPHILS # BLD: 0 K/UL (ref 0–0.1)
BASOPHILS NFR BLD: 1 % (ref 0–1)
BILIRUB SERPL-MCNC: 0.5 MG/DL (ref 0.2–1)
BILIRUB UR QL: NEGATIVE
BUN SERPL-MCNC: 71 MG/DL (ref 6–20)
BUN/CREAT SERPL: 32 (ref 12–20)
CALCIUM SERPL-MCNC: 9.8 MG/DL (ref 8.5–10.1)
CHLORIDE SERPL-SCNC: 100 MMOL/L (ref 97–108)
CO2 SERPL-SCNC: 23 MMOL/L (ref 21–32)
COLOR UR: ABNORMAL
COMMENT:: NORMAL
CREAT SERPL-MCNC: 2.24 MG/DL (ref 0.7–1.3)
CREAT UR-MCNC: 183 MG/DL
D DIMER PPP FEU-MCNC: 0.53 MG/L FEU (ref 0–0.65)
DIFFERENTIAL METHOD BLD: ABNORMAL
EKG ATRIAL RATE: 104 BPM
EKG DIAGNOSIS: NORMAL
EKG P AXIS: 39 DEGREES
EKG P-R INTERVAL: 176 MS
EKG Q-T INTERVAL: 320 MS
EKG QRS DURATION: 82 MS
EKG QTC CALCULATION (BAZETT): 420 MS
EKG R AXIS: -66 DEGREES
EKG T AXIS: 86 DEGREES
EKG VENTRICULAR RATE: 104 BPM
EOSINOPHIL # BLD: 0.1 K/UL (ref 0–0.4)
EOSINOPHIL NFR BLD: 2 % (ref 0–7)
EPITH CASTS URNS QL MICRO: ABNORMAL /LPF
ERYTHROCYTE [DISTWIDTH] IN BLOOD BY AUTOMATED COUNT: 13 % (ref 11.5–14.5)
GLOBULIN SER CALC-MCNC: 5.3 G/DL (ref 2–4)
GLUCOSE SERPL-MCNC: 114 MG/DL (ref 65–100)
GLUCOSE UR STRIP.AUTO-MCNC: NEGATIVE MG/DL
HCT VFR BLD AUTO: 49 % (ref 36.6–50.3)
HGB BLD-MCNC: 16.5 G/DL (ref 12.1–17)
HGB UR QL STRIP: NEGATIVE
IMM GRANULOCYTES # BLD AUTO: 0 K/UL (ref 0–0.04)
IMM GRANULOCYTES NFR BLD AUTO: 0 % (ref 0–0.5)
KETONES UR QL STRIP.AUTO: NEGATIVE MG/DL
LEUKOCYTE ESTERASE UR QL STRIP.AUTO: ABNORMAL
LYMPHOCYTES # BLD: 1.3 K/UL (ref 0.8–3.5)
LYMPHOCYTES NFR BLD: 17 % (ref 12–49)
MAGNESIUM SERPL-MCNC: 2.5 MG/DL (ref 1.6–2.4)
MCH RBC QN AUTO: 28.2 PG (ref 26–34)
MCHC RBC AUTO-ENTMCNC: 33.7 G/DL (ref 30–36.5)
MCV RBC AUTO: 83.6 FL (ref 80–99)
MONOCYTES # BLD: 0.8 K/UL (ref 0–1)
MONOCYTES NFR BLD: 10 % (ref 5–13)
NEUTS SEG # BLD: 5.3 K/UL (ref 1.8–8)
NEUTS SEG NFR BLD: 70 % (ref 32–75)
NITRITE UR QL STRIP.AUTO: NEGATIVE
NRBC # BLD: 0 K/UL (ref 0–0.01)
NRBC BLD-RTO: 0 PER 100 WBC
NT PRO BNP: 185 PG/ML
PH UR STRIP: 5.5 (ref 5–8)
PLATELET # BLD AUTO: 274 K/UL (ref 150–400)
PMV BLD AUTO: 10 FL (ref 8.9–12.9)
POTASSIUM SERPL-SCNC: 4.3 MMOL/L (ref 3.5–5.1)
PROT SERPL-MCNC: 9 G/DL (ref 6.4–8.2)
PROT UR STRIP-MCNC: NEGATIVE MG/DL
RBC # BLD AUTO: 5.86 M/UL (ref 4.1–5.7)
RBC #/AREA URNS HPF: ABNORMAL /HPF (ref 0–5)
SODIUM SERPL-SCNC: 133 MMOL/L (ref 136–145)
SODIUM UR-SCNC: <5 MMOL/L
SP GR UR REFRACTOMETRY: 1.02 (ref 1–1.03)
SPECIMEN HOLD: NORMAL
TROPONIN I SERPL HS-MCNC: 9 NG/L (ref 0–76)
UROBILINOGEN UR QL STRIP.AUTO: 0.2 EU/DL (ref 0.2–1)
UUN UR-MCNC: 908 MG/DL
WBC # BLD AUTO: 7.5 K/UL (ref 4.1–11.1)
WBC URNS QL MICRO: ABNORMAL /HPF (ref 0–4)

## 2023-10-13 PROCEDURE — 84484 ASSAY OF TROPONIN QUANT: CPT

## 2023-10-13 PROCEDURE — 70551 MRI BRAIN STEM W/O DYE: CPT

## 2023-10-13 PROCEDURE — 81001 URINALYSIS AUTO W/SCOPE: CPT

## 2023-10-13 PROCEDURE — 93010 ELECTROCARDIOGRAM REPORT: CPT | Performed by: INTERNAL MEDICINE

## 2023-10-13 PROCEDURE — 85025 COMPLETE CBC W/AUTO DIFF WBC: CPT

## 2023-10-13 PROCEDURE — 80053 COMPREHEN METABOLIC PANEL: CPT

## 2023-10-13 PROCEDURE — 83880 ASSAY OF NATRIURETIC PEPTIDE: CPT

## 2023-10-13 PROCEDURE — 85379 FIBRIN DEGRADATION QUANT: CPT

## 2023-10-13 PROCEDURE — 84300 ASSAY OF URINE SODIUM: CPT

## 2023-10-13 PROCEDURE — 71046 X-RAY EXAM CHEST 2 VIEWS: CPT

## 2023-10-13 PROCEDURE — 82570 ASSAY OF URINE CREATININE: CPT

## 2023-10-13 PROCEDURE — 6370000000 HC RX 637 (ALT 250 FOR IP): Performed by: HOSPITALIST

## 2023-10-13 PROCEDURE — 83735 ASSAY OF MAGNESIUM: CPT

## 2023-10-13 PROCEDURE — 36415 COLL VENOUS BLD VENIPUNCTURE: CPT

## 2023-10-13 PROCEDURE — 76770 US EXAM ABDO BACK WALL COMP: CPT

## 2023-10-13 PROCEDURE — 2580000003 HC RX 258: Performed by: HOSPITALIST

## 2023-10-13 PROCEDURE — 1100000000 HC RM PRIVATE

## 2023-10-13 PROCEDURE — 84540 ASSAY OF URINE/UREA-N: CPT

## 2023-10-13 PROCEDURE — 6360000002 HC RX W HCPCS: Performed by: HOSPITALIST

## 2023-10-13 PROCEDURE — 2580000003 HC RX 258: Performed by: EMERGENCY MEDICINE

## 2023-10-13 PROCEDURE — 99285 EMERGENCY DEPT VISIT HI MDM: CPT

## 2023-10-13 PROCEDURE — 93005 ELECTROCARDIOGRAM TRACING: CPT | Performed by: EMERGENCY MEDICINE

## 2023-10-13 RX ORDER — METOPROLOL TARTRATE 50 MG/1
50 TABLET, FILM COATED ORAL 2 TIMES DAILY
Status: DISCONTINUED | OUTPATIENT
Start: 2023-10-13 | End: 2023-10-16

## 2023-10-13 RX ORDER — 0.9 % SODIUM CHLORIDE 0.9 %
1000 INTRAVENOUS SOLUTION INTRAVENOUS ONCE
Status: COMPLETED | OUTPATIENT
Start: 2023-10-13 | End: 2023-10-13

## 2023-10-13 RX ORDER — PRAVASTATIN SODIUM 20 MG
80 TABLET ORAL NIGHTLY
Status: DISCONTINUED | OUTPATIENT
Start: 2023-10-13 | End: 2023-10-17 | Stop reason: HOSPADM

## 2023-10-13 RX ORDER — ONDANSETRON 4 MG/1
4 TABLET, ORALLY DISINTEGRATING ORAL EVERY 8 HOURS PRN
Status: DISCONTINUED | OUTPATIENT
Start: 2023-10-13 | End: 2023-10-17 | Stop reason: HOSPADM

## 2023-10-13 RX ORDER — TAMSULOSIN HYDROCHLORIDE 0.4 MG/1
0.4 CAPSULE ORAL 2 TIMES DAILY
Status: DISCONTINUED | OUTPATIENT
Start: 2023-10-13 | End: 2023-10-17 | Stop reason: HOSPADM

## 2023-10-13 RX ORDER — ACETAMINOPHEN 650 MG/1
650 SUPPOSITORY RECTAL EVERY 6 HOURS PRN
Status: DISCONTINUED | OUTPATIENT
Start: 2023-10-13 | End: 2023-10-17 | Stop reason: HOSPADM

## 2023-10-13 RX ORDER — SODIUM CHLORIDE 9 MG/ML
INJECTION, SOLUTION INTRAVENOUS PRN
Status: DISCONTINUED | OUTPATIENT
Start: 2023-10-13 | End: 2023-10-17 | Stop reason: HOSPADM

## 2023-10-13 RX ORDER — HEPARIN SODIUM 5000 [USP'U]/ML
5000 INJECTION, SOLUTION INTRAVENOUS; SUBCUTANEOUS EVERY 8 HOURS SCHEDULED
Status: DISCONTINUED | OUTPATIENT
Start: 2023-10-13 | End: 2023-10-17 | Stop reason: HOSPADM

## 2023-10-13 RX ORDER — ACETAMINOPHEN 325 MG/1
650 TABLET ORAL EVERY 6 HOURS PRN
Status: DISCONTINUED | OUTPATIENT
Start: 2023-10-13 | End: 2023-10-17 | Stop reason: HOSPADM

## 2023-10-13 RX ORDER — POLYETHYLENE GLYCOL 3350 17 G/17G
17 POWDER, FOR SOLUTION ORAL DAILY PRN
Status: DISCONTINUED | OUTPATIENT
Start: 2023-10-13 | End: 2023-10-17 | Stop reason: HOSPADM

## 2023-10-13 RX ORDER — SODIUM CHLORIDE 0.9 % (FLUSH) 0.9 %
5-40 SYRINGE (ML) INJECTION PRN
Status: DISCONTINUED | OUTPATIENT
Start: 2023-10-13 | End: 2023-10-17 | Stop reason: HOSPADM

## 2023-10-13 RX ORDER — ONDANSETRON 2 MG/ML
4 INJECTION INTRAMUSCULAR; INTRAVENOUS EVERY 6 HOURS PRN
Status: DISCONTINUED | OUTPATIENT
Start: 2023-10-13 | End: 2023-10-17 | Stop reason: HOSPADM

## 2023-10-13 RX ORDER — SODIUM CHLORIDE 0.9 % (FLUSH) 0.9 %
5-40 SYRINGE (ML) INJECTION EVERY 12 HOURS SCHEDULED
Status: DISCONTINUED | OUTPATIENT
Start: 2023-10-13 | End: 2023-10-17 | Stop reason: HOSPADM

## 2023-10-13 RX ORDER — ASPIRIN 81 MG/1
81 TABLET ORAL DAILY
Status: DISCONTINUED | OUTPATIENT
Start: 2023-10-13 | End: 2023-10-17 | Stop reason: HOSPADM

## 2023-10-13 RX ADMIN — HEPARIN SODIUM 5000 UNITS: 5000 INJECTION INTRAVENOUS; SUBCUTANEOUS at 22:04

## 2023-10-13 RX ADMIN — TAMSULOSIN HYDROCHLORIDE 0.4 MG: 0.4 CAPSULE ORAL at 22:03

## 2023-10-13 RX ADMIN — SODIUM CHLORIDE 1000 ML: 9 INJECTION, SOLUTION INTRAVENOUS at 14:15

## 2023-10-13 RX ADMIN — WATER 1000 MG: 1 INJECTION INTRAMUSCULAR; INTRAVENOUS; SUBCUTANEOUS at 22:04

## 2023-10-13 RX ADMIN — ASPIRIN 81 MG: 81 TABLET, COATED ORAL at 22:03

## 2023-10-13 ASSESSMENT — PAIN SCALES - GENERAL
PAINLEVEL_OUTOF10: 0
PAINLEVEL_OUTOF10: 0

## 2023-10-13 ASSESSMENT — PAIN - FUNCTIONAL ASSESSMENT: PAIN_FUNCTIONAL_ASSESSMENT: 0-10

## 2023-10-13 NOTE — ED TRIAGE NOTES
Patient presents for three days of lightheadedness with shortness of breath and fatigue, along with cough. Patient states when he stands he has onset of lightheadedness, feels like he has to hold onto things to prevent from falling over. Shortness of breath with walking short distances. Denies chest pain. Denies weakness, numbness, tingling, or changes in vision or speech.

## 2023-10-13 NOTE — H&P
[address] Hannah Goodson  [phone]    [address] [place] [address] Adult  Hospitalist Group    History & Physical    Date of service: 10/13/2023    Patient name: Luiza Arevalo  MRN: [id number]  YOB: 1945  Age: 78 y.o. Primary care provider:  Young Smith MD     Source of Information: patient, medical records and ERP                                  Chief complain: Weakness    History of present illness  Luiza Arevalo is a 78 y.o. male who presented with weakness. Patient is alert awake oriented x3. He is accompanied by his wife. Patient has a history of hypertension, coronary artery disease, ulcerative colitis and patient is status post proctocolectomy and he has a urostomy at this time. As per him for the last 3 to 4 days he is feeling very weak and tired. He gets really short of breath with little exertion. He is also complaining of cough which is mostly dry. He feels like he is going to pass out. He denies any chest pain. No fever or chills. He denies any nausea vomiting diarrhea or constipation. He had a blood work done in the ER and was found to have a creatinine of 2.24.  UA showed some evidence of urinary tract infection. Chest x-ray is negative for any acute findings.     Past Medical History:   Diagnosis Date    Anemia     At risk for sleep apnea 03/25/2019    per phone assessment for pat on 03/25/2019    CAD (coronary artery disease)     Essential hypertension     History of ileostomy 2003    for UC s/p total proctocolectomy    Hyperlipidemia     Ulcerative colitis ([address])     s/p total proctocolectomy with end ileostomy 2003      Past Surgical History:   Procedure Laterality Date    CARDIAC CATHETERIZATION      COLONOSCOPY      CORONARY ANGIOPLASTY WITH STENT PLACEMENT  2003    circumflex artery    ILEOSTOMY OR JEJUNOSTOMY  2003    failed ileoanal pouch anastomosis    OTHER SURGICAL HISTORY  2003    total No acute cardiopulmonary disease. Assessment and Plan         Acute on CKD stage III-IV fluids, avoid nephrotoxic medications. 2.  Coronary artery disease-patient had a stent put in in the past.  Continue aspirin, beta-blockers, statins. 3.  History of ulcerative colitis-patient had a total proctocolectomy done in the past.  He has ileostomy since 2003. 4.  Hypertension-on metoprolol. Holding losartan. 5.  Hyperlipidemia-continue statins. 6.  BPH-continue Flomax. 7.  Dyspnea on exertion-echocardiogram.  Check D-dimers. Serial troponins. 8.  Dizziness-I will hydrate him and if dizziness does not get better we will do MRI of the brain. 9.  UTI-start Rocephin IV. Urine culture. Patient is a full code. Plan of care discussed with the patient and wife at bedside.   Diet: 2 g sodium  Activity: As tolerated  DVT prophylaxis: Heparin  Isolation precautions: None       Signed by: Pat Bridges MD    October 13, 2023 at 7:36 PM

## 2023-10-14 ENCOUNTER — APPOINTMENT (OUTPATIENT)
Facility: HOSPITAL | Age: 78
DRG: 684 | End: 2023-10-14
Attending: HOSPITALIST
Payer: MEDICARE

## 2023-10-14 PROBLEM — R53.1 GENERALIZED WEAKNESS: Status: ACTIVE | Noted: 2023-10-14

## 2023-10-14 PROBLEM — R42 LIGHTHEADEDNESS: Status: ACTIVE | Noted: 2023-10-14

## 2023-10-14 PROBLEM — N18.30 CKD (CHRONIC KIDNEY DISEASE) STAGE 3, GFR 30-59 ML/MIN (HCC): Status: ACTIVE | Noted: 2023-10-14

## 2023-10-14 PROBLEM — R06.09 DOE (DYSPNEA ON EXERTION): Status: ACTIVE | Noted: 2023-10-14

## 2023-10-14 LAB
ANION GAP SERPL CALC-SCNC: 9 MMOL/L (ref 5–15)
BASOPHILS # BLD: 0 K/UL (ref 0–0.1)
BASOPHILS NFR BLD: 0 % (ref 0–1)
BUN SERPL-MCNC: 62 MG/DL (ref 6–20)
BUN/CREAT SERPL: 34 (ref 12–20)
CALCIUM SERPL-MCNC: 8.9 MG/DL (ref 8.5–10.1)
CHLORIDE SERPL-SCNC: 105 MMOL/L (ref 97–108)
CO2 SERPL-SCNC: 22 MMOL/L (ref 21–32)
COMMENT:: NORMAL
CREAT SERPL-MCNC: 1.81 MG/DL (ref 0.7–1.3)
DIFFERENTIAL METHOD BLD: NORMAL
ECHO AO ARCH DIAM: 2.5 CM
ECHO AO ASC DIAM: 3.3 CM
ECHO AO ASCENDING AORTA INDEX: 1.57 CM/M2
ECHO AV AREA PEAK VELOCITY: 4.1 CM2
ECHO AV AREA/BSA PEAK VELOCITY: 2 CM2/M2
ECHO AV PEAK GRADIENT: 3 MMHG
ECHO AV PEAK VELOCITY: 0.8 M/S
ECHO AV VELOCITY RATIO: 1
ECHO BSA: 2.13 M2
ECHO LA DIAMETER INDEX: 1.14 CM/M2
ECHO LA DIAMETER: 2.4 CM
ECHO LA VOL 2C: 20 ML (ref 18–58)
ECHO LA VOL 2C: 21 ML (ref 18–58)
ECHO LA VOL 4C: 20 ML (ref 18–58)
ECHO LA VOL 4C: 22 ML (ref 18–58)
ECHO LA VOL BP: 20 ML (ref 18–58)
ECHO LA VOL/BSA BIPLANE: 10 ML/M2 (ref 16–34)
ECHO LA VOLUME AREA LENGTH: 22 ML
ECHO LA VOLUME INDEX AREA LENGTH: 10 ML/M2 (ref 16–34)
ECHO LV E' LATERAL VELOCITY: 5 CM/S
ECHO LV E' SEPTAL VELOCITY: 5 CM/S
ECHO LV EDV A2C: 58 ML
ECHO LV EDV A4C: 79 ML
ECHO LV EDV BP: 69 ML (ref 67–155)
ECHO LV EDV INDEX A4C: 38 ML/M2
ECHO LV EDV INDEX BP: 33 ML/M2
ECHO LV EDV NDEX A2C: 28 ML/M2
ECHO LV EJECTION FRACTION A2C: 60 %
ECHO LV EJECTION FRACTION A4C: 48 %
ECHO LV EJECTION FRACTION BIPLANE: 53 % (ref 55–100)
ECHO LV ESV A2C: 23 ML
ECHO LV ESV A4C: 41 ML
ECHO LV ESV BP: 32 ML (ref 22–58)
ECHO LV ESV INDEX A2C: 11 ML/M2
ECHO LV ESV INDEX A4C: 20 ML/M2
ECHO LV ESV INDEX BP: 15 ML/M2
ECHO LV FRACTIONAL SHORTENING: 22 % (ref 28–44)
ECHO LV INTERNAL DIMENSION DIASTOLE INDEX: 2.57 CM/M2
ECHO LV INTERNAL DIMENSION DIASTOLIC: 5.4 CM (ref 4.2–5.9)
ECHO LV INTERNAL DIMENSION SYSTOLIC INDEX: 2 CM/M2
ECHO LV INTERNAL DIMENSION SYSTOLIC: 4.2 CM
ECHO LV IVSD: 0.9 CM (ref 0.6–1)
ECHO LV MASS 2D: 180.1 G (ref 88–224)
ECHO LV MASS INDEX 2D: 85.8 G/M2 (ref 49–115)
ECHO LV POSTERIOR WALL DIASTOLIC: 0.9 CM (ref 0.6–1)
ECHO LV RELATIVE WALL THICKNESS RATIO: 0.33
ECHO LVOT AREA: 4.2 CM2
ECHO LVOT DIAM: 2.3 CM
ECHO LVOT MEAN GRADIENT: 2 MMHG
ECHO LVOT PEAK GRADIENT: 3 MMHG
ECHO LVOT PEAK VELOCITY: 0.8 M/S
ECHO LVOT STROKE VOLUME INDEX: 32.8 ML/M2
ECHO LVOT SV: 68.9 ML
ECHO LVOT VTI: 16.6 CM
ECHO MV A VELOCITY: 0.74 M/S
ECHO MV E DECELERATION TIME (DT): 266.8 MS
ECHO MV E VELOCITY: 0.44 M/S
ECHO MV E/A RATIO: 0.59
ECHO MV E/E' LATERAL: 8.8
ECHO MV E/E' RATIO (AVERAGED): 8.8
ECHO MV E/E' SEPTAL: 8.8
ECHO PULMONARY ARTERY END DIASTOLIC PRESSURE: 2 MMHG
ECHO PV MAX VELOCITY: 0.6 M/S
ECHO PV PEAK GRADIENT: 1 MMHG
ECHO PV REGURGITANT MAX VELOCITY: 0.6 M/S
ECHO RV FREE WALL PEAK S': 6 CM/S
ECHO RV INTERNAL DIMENSION: 3.9 CM
ECHO RV TAPSE: 1.8 CM (ref 1.7–?)
ECHO TV REGURGITANT MAX VELOCITY: 1.99 M/S
ECHO TV REGURGITANT PEAK GRADIENT: 16 MMHG
EOSINOPHIL # BLD: 0.4 K/UL (ref 0–0.4)
EOSINOPHIL NFR BLD: 5 % (ref 0–7)
ERYTHROCYTE [DISTWIDTH] IN BLOOD BY AUTOMATED COUNT: 13.2 % (ref 11.5–14.5)
FLUAV RNA SPEC QL NAA+PROBE: NOT DETECTED
FLUBV RNA SPEC QL NAA+PROBE: NOT DETECTED
GLUCOSE SERPL-MCNC: 123 MG/DL (ref 65–100)
HCT VFR BLD AUTO: 41.6 % (ref 36.6–50.3)
HGB BLD-MCNC: 14 G/DL (ref 12.1–17)
IMM GRANULOCYTES # BLD AUTO: 0 K/UL (ref 0–0.04)
IMM GRANULOCYTES NFR BLD AUTO: 0 % (ref 0–0.5)
LYMPHOCYTES # BLD: 1.5 K/UL (ref 0.8–3.5)
LYMPHOCYTES NFR BLD: 21 % (ref 12–49)
MCH RBC QN AUTO: 28.2 PG (ref 26–34)
MCHC RBC AUTO-ENTMCNC: 33.7 G/DL (ref 30–36.5)
MCV RBC AUTO: 83.9 FL (ref 80–99)
MONOCYTES # BLD: 0.8 K/UL (ref 0–1)
MONOCYTES NFR BLD: 11 % (ref 5–13)
NEUTS SEG # BLD: 4.6 K/UL (ref 1.8–8)
NEUTS SEG NFR BLD: 63 % (ref 32–75)
NRBC # BLD: 0 K/UL (ref 0–0.01)
NRBC BLD-RTO: 0 PER 100 WBC
PLATELET # BLD AUTO: 210 K/UL (ref 150–400)
PMV BLD AUTO: 10 FL (ref 8.9–12.9)
POTASSIUM SERPL-SCNC: 4 MMOL/L (ref 3.5–5.1)
RBC # BLD AUTO: 4.96 M/UL (ref 4.1–5.7)
SARS-COV-2 RNA RESP QL NAA+PROBE: NOT DETECTED
SODIUM SERPL-SCNC: 136 MMOL/L (ref 136–145)
SPECIMEN HOLD: NORMAL
TROPONIN I SERPL HS-MCNC: 10 NG/L (ref 0–76)
TROPONIN I SERPL HS-MCNC: 11 NG/L (ref 0–76)
TROPONIN I SERPL HS-MCNC: 7 NG/L (ref 0–76)
WBC # BLD AUTO: 7.3 K/UL (ref 4.1–11.1)

## 2023-10-14 PROCEDURE — 6370000000 HC RX 637 (ALT 250 FOR IP): Performed by: HOSPITALIST

## 2023-10-14 PROCEDURE — 84484 ASSAY OF TROPONIN QUANT: CPT

## 2023-10-14 PROCEDURE — 97161 PT EVAL LOW COMPLEX 20 MIN: CPT

## 2023-10-14 PROCEDURE — 87086 URINE CULTURE/COLONY COUNT: CPT

## 2023-10-14 PROCEDURE — 1100000000 HC RM PRIVATE

## 2023-10-14 PROCEDURE — 93306 TTE W/DOPPLER COMPLETE: CPT | Performed by: INTERNAL MEDICINE

## 2023-10-14 PROCEDURE — 94761 N-INVAS EAR/PLS OXIMETRY MLT: CPT

## 2023-10-14 PROCEDURE — 85025 COMPLETE CBC W/AUTO DIFF WBC: CPT

## 2023-10-14 PROCEDURE — 6360000002 HC RX W HCPCS: Performed by: HOSPITALIST

## 2023-10-14 PROCEDURE — 2580000003 HC RX 258: Performed by: INTERNAL MEDICINE

## 2023-10-14 PROCEDURE — 36415 COLL VENOUS BLD VENIPUNCTURE: CPT

## 2023-10-14 PROCEDURE — 80048 BASIC METABOLIC PNL TOTAL CA: CPT

## 2023-10-14 PROCEDURE — 97530 THERAPEUTIC ACTIVITIES: CPT

## 2023-10-14 PROCEDURE — 87636 SARSCOV2 & INF A&B AMP PRB: CPT

## 2023-10-14 PROCEDURE — 93306 TTE W/DOPPLER COMPLETE: CPT

## 2023-10-14 PROCEDURE — 2580000003 HC RX 258: Performed by: HOSPITALIST

## 2023-10-14 RX ORDER — SODIUM CHLORIDE 9 MG/ML
INJECTION, SOLUTION INTRAVENOUS CONTINUOUS
Status: DISPENSED | OUTPATIENT
Start: 2023-10-14 | End: 2023-10-15

## 2023-10-14 RX ADMIN — PRAVASTATIN SODIUM 80 MG: 20 TABLET ORAL at 21:04

## 2023-10-14 RX ADMIN — SODIUM CHLORIDE, PRESERVATIVE FREE 10 ML: 5 INJECTION INTRAVENOUS at 21:06

## 2023-10-14 RX ADMIN — SODIUM CHLORIDE, PRESERVATIVE FREE 10 ML: 5 INJECTION INTRAVENOUS at 08:50

## 2023-10-14 RX ADMIN — HEPARIN SODIUM 5000 UNITS: 5000 INJECTION INTRAVENOUS; SUBCUTANEOUS at 21:05

## 2023-10-14 RX ADMIN — METOPROLOL TARTRATE 50 MG: 50 TABLET ORAL at 08:50

## 2023-10-14 RX ADMIN — TAMSULOSIN HYDROCHLORIDE 0.4 MG: 0.4 CAPSULE ORAL at 21:05

## 2023-10-14 RX ADMIN — TAMSULOSIN HYDROCHLORIDE 0.4 MG: 0.4 CAPSULE ORAL at 08:50

## 2023-10-14 RX ADMIN — HEPARIN SODIUM 5000 UNITS: 5000 INJECTION INTRAVENOUS; SUBCUTANEOUS at 13:13

## 2023-10-14 RX ADMIN — METOPROLOL TARTRATE 50 MG: 50 TABLET ORAL at 21:04

## 2023-10-14 RX ADMIN — ASPIRIN 81 MG: 81 TABLET, COATED ORAL at 08:50

## 2023-10-14 RX ADMIN — WATER 1000 MG: 1 INJECTION INTRAMUSCULAR; INTRAVENOUS; SUBCUTANEOUS at 21:05

## 2023-10-14 RX ADMIN — SODIUM CHLORIDE: 9 INJECTION, SOLUTION INTRAVENOUS at 16:16

## 2023-10-14 RX ADMIN — HEPARIN SODIUM 5000 UNITS: 5000 INJECTION INTRAVENOUS; SUBCUTANEOUS at 06:38

## 2023-10-14 NOTE — ED NOTES
TRANSFER - OUT REPORT:    Verbal report given to *** on Ursula Schmidt  being transferred to *** for {Transfer UMGA:15836}       Report consisted of patient's Situation, Background, Assessment and   Recommendations(SBAR). Information from the following report(s) {SBAR Reports List:80418} was reviewed with the receiving nurse. [place] Fall Assessment:    Presents to emergency department  because of falls (Syncope, seizure, or loss of consciousness): No  Age > 70: Yes  Altered Mental Status, Intoxication with alcohol or substance confusion (Disorientation, impaired judgment, poor safety awaremess, or inability to follow instructions): No  Impaired Mobility: Ambulates or transfers with assistive devices or assistance; Unable to ambulate or transer.: No  Nursing Judgement: No          Lines:   Peripheral IV 10/13/23 Right Antecubital (Active)   Site Assessment Clean, dry & intact 10/13/23 1341   Line Status Blood return noted 10/13/23 1341   Phlebitis Assessment No symptoms 10/13/23 1341   Infiltration Assessment 0 10/13/23 1341   Dressing Status Clean, dry & intact 10/13/23 1341   Dressing Type Transparent 10/13/23 1341        Opportunity for questions and clarification was provided.       Patient transported with:  {Transport Details:79954}

## 2023-10-14 NOTE — PROGRESS NOTES
PHYSICAL THERAPY EVALUATION/DISCHARGE    Patient: Joshua Casiano (3 y.o. male)  Date: 10/14/2023  Primary Diagnosis: Dyspnea on exertion [R06.09]  Light headedness [R42]  GHASSAN (acute kidney injury) ([address]) [N17.9]  Acute kidney injury ([address]) [N17.9]       Precautions: Fall Risk                    ASSESSMENT AND RECOMMENDATIONS:  Based on the objective data below, the patient presents with no further acute PT needs s/p admission for shortness of breath and lightheadedness with standing. On evaluation, patient mobilizes with independence, given stand by assistance while ambulating for safety. Patient with 1 mild loss of balance while walking and was educated to utilize [facility] to reduce fall risk. BP's soft throughout position changes and stable following activity with report of mild dizziness once standing and ambulating. Patient reported shortness of breath while ambulating but Spo2 was 97%. Patient was educated to allow time to pass after standing before stepping away from the chair/bed to ensure body has appropriately acclimated to position change. Patient is safe to return home with no additional therapy needs. Will complete order. 10/14/23 1411 10/14/23 1413 10/14/23 1415   Vital Signs   /73 (!) 101/45 (!) 103/59   MAP (Calculated) 85 64 74   MAP (mmHg) 84 (!) 62 70   Patient Position Other (Comment) Sitting Standing      10/14/23 1420   Vital Signs   /72   MAP (Calculated) 89   MAP (mmHg) 85   Patient Position Sitting       Functional Outcome Measure: The patient scored 24 on the [facility] outcome measure which is indicative of intact functional mobility. Further skilled acute physical therapy is not indicated at this time.        PLAN :  Recommendation for discharge: (in order for the patient to meet his/her long term goals): No skilled physical therapy    Other factors to consider for discharge: no additional factors    IF patient discharges home will need the following DME: none       SUBJECTIVE:

## 2023-10-14 NOTE — PROGRESS NOTES
Patient waiting to be seen by Cardiologist. Paged on-call cardiologist at [phone]. Per representative, on-call MD is Dr. Jack Garcia.

## 2023-10-15 LAB
ANION GAP SERPL CALC-SCNC: 7 MMOL/L (ref 5–15)
BACTERIA SPEC CULT: NORMAL
BUN SERPL-MCNC: 52 MG/DL (ref 6–20)
BUN/CREAT SERPL: 33 (ref 12–20)
CALCIUM SERPL-MCNC: 8.7 MG/DL (ref 8.5–10.1)
CHLORIDE SERPL-SCNC: 104 MMOL/L (ref 97–108)
CO2 SERPL-SCNC: 21 MMOL/L (ref 21–32)
CREAT SERPL-MCNC: 1.56 MG/DL (ref 0.7–1.3)
GLUCOSE SERPL-MCNC: 118 MG/DL (ref 65–100)
POTASSIUM SERPL-SCNC: 4.2 MMOL/L (ref 3.5–5.1)
SERVICE CMNT-IMP: NORMAL
SODIUM SERPL-SCNC: 132 MMOL/L (ref 136–145)

## 2023-10-15 PROCEDURE — 1100000000 HC RM PRIVATE

## 2023-10-15 PROCEDURE — 97165 OT EVAL LOW COMPLEX 30 MIN: CPT

## 2023-10-15 PROCEDURE — 6360000002 HC RX W HCPCS: Performed by: HOSPITALIST

## 2023-10-15 PROCEDURE — 99223 1ST HOSP IP/OBS HIGH 75: CPT | Performed by: SPECIALIST

## 2023-10-15 PROCEDURE — 80048 BASIC METABOLIC PNL TOTAL CA: CPT

## 2023-10-15 PROCEDURE — 6370000000 HC RX 637 (ALT 250 FOR IP): Performed by: HOSPITALIST

## 2023-10-15 PROCEDURE — 2580000003 HC RX 258: Performed by: INTERNAL MEDICINE

## 2023-10-15 PROCEDURE — 94761 N-INVAS EAR/PLS OXIMETRY MLT: CPT

## 2023-10-15 PROCEDURE — 2580000003 HC RX 258: Performed by: HOSPITALIST

## 2023-10-15 PROCEDURE — 36415 COLL VENOUS BLD VENIPUNCTURE: CPT

## 2023-10-15 PROCEDURE — 97535 SELF CARE MNGMENT TRAINING: CPT

## 2023-10-15 RX ORDER — SODIUM CHLORIDE 9 MG/ML
INJECTION, SOLUTION INTRAVENOUS CONTINUOUS
Status: DISPENSED | OUTPATIENT
Start: 2023-10-15 | End: 2023-10-15

## 2023-10-15 RX ADMIN — ASPIRIN 81 MG: 81 TABLET, COATED ORAL at 10:20

## 2023-10-15 RX ADMIN — HEPARIN SODIUM 5000 UNITS: 5000 INJECTION INTRAVENOUS; SUBCUTANEOUS at 05:36

## 2023-10-15 RX ADMIN — HEPARIN SODIUM 5000 UNITS: 5000 INJECTION INTRAVENOUS; SUBCUTANEOUS at 21:26

## 2023-10-15 RX ADMIN — HEPARIN SODIUM 5000 UNITS: 5000 INJECTION INTRAVENOUS; SUBCUTANEOUS at 14:20

## 2023-10-15 RX ADMIN — METOPROLOL TARTRATE 50 MG: 50 TABLET ORAL at 10:20

## 2023-10-15 RX ADMIN — SODIUM CHLORIDE: 9 INJECTION, SOLUTION INTRAVENOUS at 10:20

## 2023-10-15 RX ADMIN — TAMSULOSIN HYDROCHLORIDE 0.4 MG: 0.4 CAPSULE ORAL at 20:47

## 2023-10-15 RX ADMIN — TAMSULOSIN HYDROCHLORIDE 0.4 MG: 0.4 CAPSULE ORAL at 10:19

## 2023-10-15 RX ADMIN — SODIUM CHLORIDE, PRESERVATIVE FREE 10 ML: 5 INJECTION INTRAVENOUS at 10:20

## 2023-10-15 RX ADMIN — WATER 1000 MG: 1 INJECTION INTRAMUSCULAR; INTRAVENOUS; SUBCUTANEOUS at 20:47

## 2023-10-15 RX ADMIN — PRAVASTATIN SODIUM 80 MG: 20 TABLET ORAL at 20:47

## 2023-10-15 RX ADMIN — SODIUM CHLORIDE, PRESERVATIVE FREE 10 ML: 5 INJECTION INTRAVENOUS at 20:47

## 2023-10-15 NOTE — PROGRESS NOTES
NUTRITION    Best practice alert was triggered based on results obtained during nursing admission assessment for Weight loss 24-33#. Wt Readings from Last 10 Encounters:   10/14/23 92.1 kg (203 lb)   05/20/23 98.4 kg (217 lb)   05/05/23 108.9 kg (240 lb)   04/29/23 108.9 kg (240 lb)   10/29/22 108.9 kg (240 lb)        Admission weight of 203# is stated; RD ordered measured weight to be obtained . Per documentation, patient has lost 37# (15.4%) x 5 months if all weights accurate. .  Plan to see patient for rescreen per policy within 1 - 3 days. Thank you. Recommendations:   1.  Obtain measured weight    Dorian Larose RD MS  Ext: [phone], or via Perfect[employer]

## 2023-10-15 NOTE — PLAN OF CARE
Problem: Discharge Planning  Goal: Discharge to home or other facility with appropriate resources  10/15/2023 1021 by José Luis Hassan RN  Outcome: Progressing  10/15/2023 0641 by Rayray Rutherford RN  Outcome: Progressing     Problem: Pain  Goal: Verbalizes/displays adequate comfort level or baseline comfort level  10/15/2023 1021 by José Luis Hassan RN  Outcome: Progressing  10/15/2023 0641 by Rayray Rutherford RN  Outcome: Progressing     Problem: Safety - Adult  Goal: Free from fall injury  Outcome: Progressing

## 2023-10-15 NOTE — CONSULTS
results for input(s): \"CPK\" in the last 72 hours. Invalid input(s): \"CKQMB\", \"CPKMB\", \"TROIQ\", \"BMPP\"  Recent Labs     10/14/23  0150 10/15/23  0421    132*   K 4.0 4.2    104   CO2 22 21   BUN 62* 52*     Recent Labs     10/13/23  1345 10/14/23  0150   WBC 7.5 7.3   HGB 16.5 14.0   HCT 49.0 41.6    210     No results for input(s): \"INR\" in the last 72 hours. Invalid input(s): \"PTTP\", \"PTP\", \"GPT\", \"SGOT\", \"AP\"  No results for input(s): \"CHOL\", \"HDLC\", \"LDLC\", \"HBA1C\" in the last 72 hours. Invalid input(s): \"TGL\"  No results for input(s): \"ESR\", \"CRP\", \"TSH\" in the last 72 hours.   Radiology: XR Results (most recent):  @BSHSILASTIMGCAT(QSG4847:1)@  CT Results (most recent):  @BSHSILASTIMGCAT(LLM8656:1)@  Deborah Lacy MD  Current meds:    Current Facility-Administered Medications:     metoprolol tartrate (LOPRESSOR) tablet 50 mg, 50 mg, Oral, BID, Olivia Romero MD, 50 mg at 10/15/23 1020    pravastatin (PRAVACHOL) tablet 80 mg, 80 mg, Oral, Nightly, Olivia Romero MD, 80 mg at 10/14/23 2104    tamsulosin (FLOMAX) capsule 0.4 mg, 0.4 mg, Oral, BID, Olivia Romero MD, 0.4 mg at 10/15/23 1019    sodium chloride flush 0.9 % injection 5-40 mL, 5-40 mL, IntraVENous, 2 times per day, Giovanni Cotter MD, 10 mL at 10/15/23 1020    sodium chloride flush 0.9 % injection 5-40 mL, 5-40 mL, IntraVENous, PRN, Olivia Romero MD    0.9 % sodium chloride infusion, , IntraVENous, PRN, Olivia Romero MD    ondansetron (ZOFRAN-ODT) disintegrating tablet 4 mg, 4 mg, Oral, Q8H PRN **OR** ondansetron (ZOFRAN) injection 4 mg, 4 mg, IntraVENous, Q6H PRN, Olivia Romero MD    polyethylene glycol (GLYCOLAX) packet 17 g, 17 g, Oral, Daily PRN, Olivia Garcia MD    acetaminophen (TYLENOL) tablet 650 mg, 650 mg, Oral, Q6H PRN **OR** acetaminophen (TYLENOL) suppository 650 mg, 650 mg, Rectal, Q6H PRN, Olivia Romero MD    heparin (porcine) injection 5,000 Units, 5,000 Units, SubCUTAneous, 3 times per day, Lady Joseph MD, 5,000 Units at 10/15/23 1420    cefTRIAXone (ROCEPHIN) 1,000 mg in sterile water 10 mL IV syringe, 1,000 mg, IntraVENous, Q24H, Olivia Romero MD, 1,000 mg at 10/14/23 2105    aspirin EC tablet 81 mg, 81 mg, Oral, Daily, Olivia Moreno MD, 81 mg at 10/15/23 1020    Facility-Administered Medications Ordered in Other Encounters:     acetaminophen (TYLENOL) tablet 975 mg, 975 mg, Oral, Q6H, Jose Boothe MD    enoxaparin Sodium (LOVENOX) injection 30 mg, 30 mg, SubCUTAneous, BID, Jose Boothe MD    losartan (COZAAR) tablet 100 mg, 100 mg, Oral, QPM, Jose Boothe MD    oxyCODONE (ROXICODONE) immediate release tablet 5 mg, 5 mg, Oral, Q4H PRN, Jose Boothe MD  [email or web address]   Patient Care Team:  Rosanne Rojo MD as PCP - Sita LOU, [facility]   [facility] [address] , [address] [place] [address] ,[facility] [phone]  Nilesh Burris  [phone]

## 2023-10-16 ENCOUNTER — APPOINTMENT (OUTPATIENT)
Facility: HOSPITAL | Age: 78
DRG: 684 | End: 2023-10-16
Payer: MEDICARE

## 2023-10-16 LAB
ECHO BSA: 2.14 M2
NUC STRESS EJECTION FRACTION: 80 %
STRESS ESTIMATED WORKLOAD: 1 METS
STRESS PEAK DIAS BP: 59 MMHG
STRESS PEAK SYS BP: 132 MMHG
STRESS PERCENT HR ACHIEVED: 80 %
STRESS POST PEAK HR: 112 BPM
STRESS RATE PRESSURE PRODUCT: NORMAL BPM*MMHG
STRESS TARGET HR: 140 BPM

## 2023-10-16 PROCEDURE — 6370000000 HC RX 637 (ALT 250 FOR IP): Performed by: INTERNAL MEDICINE

## 2023-10-16 PROCEDURE — 6360000002 HC RX W HCPCS: Performed by: HOSPITALIST

## 2023-10-16 PROCEDURE — 3430000000 HC RX DIAGNOSTIC RADIOPHARMACEUTICAL: Performed by: SPECIALIST

## 2023-10-16 PROCEDURE — 99233 SBSQ HOSP IP/OBS HIGH 50: CPT | Performed by: SPECIALIST

## 2023-10-16 PROCEDURE — 6360000002 HC RX W HCPCS: Performed by: INTERNAL MEDICINE

## 2023-10-16 PROCEDURE — 2580000003 HC RX 258: Performed by: HOSPITALIST

## 2023-10-16 PROCEDURE — 90686 IIV4 VACC NO PRSV 0.5 ML IM: CPT | Performed by: INTERNAL MEDICINE

## 2023-10-16 PROCEDURE — 93016 CV STRESS TEST SUPVJ ONLY: CPT | Performed by: SPECIALIST

## 2023-10-16 PROCEDURE — G0008 ADMIN INFLUENZA VIRUS VAC: HCPCS | Performed by: INTERNAL MEDICINE

## 2023-10-16 PROCEDURE — 93018 CV STRESS TEST I&R ONLY: CPT | Performed by: SPECIALIST

## 2023-10-16 PROCEDURE — 94761 N-INVAS EAR/PLS OXIMETRY MLT: CPT

## 2023-10-16 PROCEDURE — 78452 HT MUSCLE IMAGE SPECT MULT: CPT | Performed by: SPECIALIST

## 2023-10-16 PROCEDURE — 6370000000 HC RX 637 (ALT 250 FOR IP): Performed by: HOSPITALIST

## 2023-10-16 PROCEDURE — 6360000002 HC RX W HCPCS

## 2023-10-16 PROCEDURE — A9500 TC99M SESTAMIBI: HCPCS | Performed by: SPECIALIST

## 2023-10-16 PROCEDURE — 1100000000 HC RM PRIVATE

## 2023-10-16 RX ORDER — CETIRIZINE HYDROCHLORIDE 10 MG/1
10 TABLET ORAL DAILY
Qty: 30 TABLET | Refills: 0 | Status: SHIPPED | OUTPATIENT
Start: 2023-10-17 | End: 2023-11-16

## 2023-10-16 RX ORDER — FLUTICASONE PROPIONATE 50 MCG
1 SPRAY, SUSPENSION (ML) NASAL DAILY
Status: DISCONTINUED | OUTPATIENT
Start: 2023-10-16 | End: 2023-10-17 | Stop reason: HOSPADM

## 2023-10-16 RX ORDER — REGADENOSON 0.08 MG/ML
INJECTION, SOLUTION INTRAVENOUS
Status: COMPLETED
Start: 2023-10-16 | End: 2023-10-16

## 2023-10-16 RX ORDER — FLUTICASONE PROPIONATE 50 MCG
1 SPRAY, SUSPENSION (ML) NASAL DAILY
Qty: 9.9 EACH | Refills: 0 | Status: SHIPPED | OUTPATIENT
Start: 2023-10-17 | End: 2023-11-16

## 2023-10-16 RX ORDER — CETIRIZINE HYDROCHLORIDE 10 MG/1
10 TABLET ORAL DAILY
Status: DISCONTINUED | OUTPATIENT
Start: 2023-10-16 | End: 2023-10-17 | Stop reason: HOSPADM

## 2023-10-16 RX ORDER — TETRAKIS(2-METHOXYISOBUTYLISOCYANIDE)COPPER(I) TETRAFLUOROBORATE 1 MG/ML
10.8 INJECTION, POWDER, LYOPHILIZED, FOR SOLUTION INTRAVENOUS
Status: COMPLETED | OUTPATIENT
Start: 2023-10-16 | End: 2023-10-16

## 2023-10-16 RX ORDER — PANTOPRAZOLE SODIUM 40 MG/1
40 TABLET, DELAYED RELEASE ORAL
Status: DISCONTINUED | OUTPATIENT
Start: 2023-10-17 | End: 2023-10-17 | Stop reason: HOSPADM

## 2023-10-16 RX ORDER — PANTOPRAZOLE SODIUM 40 MG/1
40 TABLET, DELAYED RELEASE ORAL
Qty: 30 TABLET | Refills: 0 | Status: SHIPPED | OUTPATIENT
Start: 2023-10-17 | End: 2023-11-16

## 2023-10-16 RX ORDER — TETRAKIS(2-METHOXYISOBUTYLISOCYANIDE)COPPER(I) TETRAFLUOROBORATE 1 MG/ML
27 INJECTION, POWDER, LYOPHILIZED, FOR SOLUTION INTRAVENOUS
Status: COMPLETED | OUTPATIENT
Start: 2023-10-16 | End: 2023-10-16

## 2023-10-16 RX ADMIN — TAMSULOSIN HYDROCHLORIDE 0.4 MG: 0.4 CAPSULE ORAL at 20:44

## 2023-10-16 RX ADMIN — REGADENOSON 0.4 MG: 0.08 INJECTION, SOLUTION INTRAVENOUS at 14:26

## 2023-10-16 RX ADMIN — INFLUENZA VIRUS VACCINE 0.5 ML: 15; 15; 15; 15 SUSPENSION INTRAMUSCULAR at 18:29

## 2023-10-16 RX ADMIN — TETRAKIS(2-METHOXYISOBUTYLISOCYANIDE)COPPER(I) TETRAFLUOROBORATE 10.8 MILLICURIE: 1 INJECTION, POWDER, LYOPHILIZED, FOR SOLUTION INTRAVENOUS at 13:06

## 2023-10-16 RX ADMIN — SODIUM CHLORIDE, PRESERVATIVE FREE 10 ML: 5 INJECTION INTRAVENOUS at 20:44

## 2023-10-16 RX ADMIN — WATER 1000 MG: 1 INJECTION INTRAMUSCULAR; INTRAVENOUS; SUBCUTANEOUS at 20:44

## 2023-10-16 RX ADMIN — PRAVASTATIN SODIUM 80 MG: 20 TABLET ORAL at 20:44

## 2023-10-16 RX ADMIN — TETRAKIS(2-METHOXYISOBUTYLISOCYANIDE)COPPER(I) TETRAFLUOROBORATE 27 MILLICURIE: 1 INJECTION, POWDER, LYOPHILIZED, FOR SOLUTION INTRAVENOUS at 14:36

## 2023-10-16 RX ADMIN — HEPARIN SODIUM 5000 UNITS: 5000 INJECTION INTRAVENOUS; SUBCUTANEOUS at 15:59

## 2023-10-16 RX ADMIN — TAMSULOSIN HYDROCHLORIDE 0.4 MG: 0.4 CAPSULE ORAL at 11:44

## 2023-10-16 RX ADMIN — ASPIRIN 81 MG: 81 TABLET, COATED ORAL at 11:44

## 2023-10-16 RX ADMIN — HEPARIN SODIUM 5000 UNITS: 5000 INJECTION INTRAVENOUS; SUBCUTANEOUS at 05:58

## 2023-10-16 RX ADMIN — CETIRIZINE HYDROCHLORIDE 10 MG: 10 TABLET, FILM COATED ORAL at 11:44

## 2023-10-16 NOTE — DISCHARGE INSTRUCTIONS
Hospital Medicine DISCHARGE INSTRUCTIONS    NAME:   Kezia Colon   :  1945   MRN:  [id number]     Date:     10/16/2023    ADMIT DATE: 10/13/2023     DISCHARGE DATE: 10/16/2023     PRINCIPAL ADMITTING DIAGNOSIS:  Dyspnea on exertion [R06.09]  Light headedness [R42]  GHASSAN (acute kidney injury) ([address]) [N17.9]  Acute kidney injury ([address]) [N17.9]    Discharge Diagnoses:  Acute Kidney Injury  HIGUERA    MEDICATIONS:    It is important that medications are taken exactly as they are prescribed on the discharge medication instructions and keep them your  in the bottles provided by the pharmacist.   Keep a list of the medication names, dosages, and times to be taken at all times. Do not take other medications without consulting your doctor. Recommended diet:  regular diet    Recommended activity: activity as tolerated    Post discharge care:    Notify follow up health care provider or return to the emergency department if you cannot get hold of your doctor if you feel worse or experience symptoms similar to those that brought you to hospital    Jd Rosado [facility], [address] [place] [address]  [phone]    Call  to schedule follow up as needed       Information obtained by :  I understand that if any problems occur once I am at home I am to contact my physician and I understand and acknowledge receipt of the instructions indicated above.                                                                                                                                            Physician's or R.N.'s Signature                                                                  Date/Time                                                                                                                                              Patient or Representative Signature                                                          Date/Time

## 2023-10-16 NOTE — CARE COORDINATION
10/16/2023  11:59 AM  Care Management Progress Note      ICD-10-CM    1. Dyspnea on exertion  R06.09 Echo (TTE) complete (PRN contrast/bubble/strain/3D)     Echo (TTE) complete (PRN contrast/bubble/strain/3D)     Nuclear stress test with myocardial perfusion     Nuclear stress test with myocardial perfusion      2. Acute kidney injury ([address])  N17.9       3. Light headedness  R42           RUR:  13%  Risk Level: [x]Low []Moderate []High    Transition of care plan:  Awaiting medical clearance and DC order. Cards following. Home. No CM needs indicated. Outpatient follow-up. Pt's family to transport.

## 2023-10-16 NOTE — PLAN OF CARE
Problem: Discharge Planning  Goal: Discharge to home or other facility with appropriate resources  Outcome: Progressing     Problem: Pain  Goal: Verbalizes/displays adequate comfort level or baseline comfort level  Outcome: Progressing     Problem: Safety - Adult  Goal: Free from fall injury  Outcome: Progressing  Flowsheets (Taken 10/15/2023 1406 by Nicolasa Nava RN)  Free From Fall Injury: Instruct family/caregiver on patient safety

## 2023-10-16 NOTE — PROGRESS NOTES
Physician Progress Note      PATIENT:               Jorge Luis Sanabria  CSN #:                  [id number]  :                       1945  ADMIT DATE:       10/13/2023 1:42 PM  [facility] DATE:  Merliny Matt  PROVIDER #:        Keyanna Reece MD          QUERY TEXTDenita Reynaldoer afternoon  Patient admitted with GHASSAN. Pt noted to have BPH and was treated with (Flomax). ?    If possible, please document in progress notes and discharge summary if you   are evaluating and/or treating any of the following: The medical record reflects the following:  Risk Factors: Age, acute illness, GHASSAN  Clinical Indicators: Patieet presented with weakness and fatigue. Known hx of   BPH  US Retroperitoneal-3 cm bladder calculus  Treatment: PO Flomax, IV Rocephin for UTI, US    Thank you  Jn Isaac RN [facility]  [phone]  Options provided:  -- BPH with partial/complete urinary obstruction  -- BPH with urinary retention without obstruction  -- Other - I will add my own diagnosis  -- Disagree - Not applicable / Not valid  -- Disagree - Clinically unable to determine / Unknown  -- Refer to Clinical Documentation Reviewer    PROVIDER RESPONSE TEXT:    Provider is clinically unable to determine a response to this query. Query created by: Jn Isaac on 10/16/2023 4:14 PM      QUERY TEXT:    Good afternoon  Patient admitted with [facility]. Noted documentation of UTI H&P 10/13. In order to support the diagnosis of Uti, please include additional clinical   indicators in your documentation. Or please document if the diagnosis of Uti   has been ruled out after further study. The medical record reflects the following:  Risk Factors: the 78yrs old male. Clinical Indicators: H&P 10/13- UTI-start Rocephin IV. H&P 10/13- UA showed some evidence of urinary tract infection. LEUKOCYTE ESTERASE UR QL STRIP. AUTO-abnormal.  urine culture 10/15-Culture No growth (<1,000 CFU/ML)  Treatment: IV ROCEPHIN    Thank you  Abimbola Canseco

## 2023-10-16 NOTE — CARE COORDINATION
10/16/23 1530   Service Assessment   Patient Orientation Alert and Oriented   Cognition Alert   History Provided By Patient   Primary Caregiver Self   Support Systems Spouse/Significant Other   Patient's Healthcare Decision Maker is: Legal Next of Kin   PCP Verified by CM Yes   Last Visit to PCP Within last year   Prior Functional Level Independent in ADLs/IADLs   Current Functional Level Independent in ADLs/IADLs   Can patient return to prior living arrangement Yes   Ability to make needs known: Good   Family able to assist with home care needs: Yes   Would you like for me to discuss the discharge plan with any other family members/significant others, and if so, who? No   Financial Resources Medicare   Social/Functional History   Home Layout One level   Home Equipment None   Receives Help From Family   ADL Assistance Independent   Homemaking Assistance Independent   Ambulation Assistance Independent   Transfer Assistance Independent   Mode of Transportation Car   Discharge Planning   Living Arrangements Spouse/Significant Other   Current Services Prior To Admission None   DME Ordered?  No   Potential Assistance Purchasing Medications No   Type of Home Care Services None   Services At/After Discharge   Services At/After Discharge None   Mode of Transport at Discharge Other (see comment)   Confirm Follow Up Transport Family

## 2023-10-16 NOTE — CONSULTS
Name: Loma Se [facility]: [facility]   : 1945 Admit Date: 10/13/2023   Phone: [phone]  Room: 407/01   PCP: Randa Goss MD  MRN: [id number]   Date: 10/16/2023  Code: Full Code          Chart and notes reviewed. Data reviewed. I review the patient's current medications in the medical record at each encounter. I have evaluated and examined the patient. HPI:    4:25 PM       History was obtained from patient. I was asked by Carlos Dow MD to see Joshua Casiano in consultation for a chief complaint of cough. History of Present Illness:  Mr. Suresh Allen is an 77 yo gentleman with a history of CAD, Ulcerative Colitis (s/p proctocolectomy), and HTN who is admitted with with GHASSAN on CKD and shortness of breath, we care consulted for chronic cough. He described several days of shortness of breath on exertion, weakness and fatigue. He has had a cough for the past month that was initially dry and now at times is wet, but not productive. No wheezing, chest tightness or edema. Denies a history of allergies, asthma or recurrent bronchitis. No overt GERD.      Labs: Na 132, cr 1.56, WBC 7.3,  on 10/14 (were 100 on 10/13), d-dimer 0.53, flu and COVID negative    Images reviewed:  CXR 10/13/2023: lungs clear    TTE 10/14/2023: EF 45-50%, abnormal diastolic function, normal RVSP    Nuclear stress test 10/16/2023: normal      Past Medical History:   Diagnosis Date    Anemia     At risk for sleep apnea 2019    per phone assessment for pat on 2019    CAD (coronary artery disease)     Essential hypertension     History of ileostomy     for UC s/p total proctocolectomy    Hyperlipidemia     Ulcerative colitis ([address])     s/p total proctocolectomy with end ileostomy        Past Surgical History:   Procedure Laterality Date    CARDIAC CATHETERIZATION      COLONOSCOPY      CORONARY ANGIOPLASTY WITH STENT PLACEMENT      circumflex artery    ILEOSTOMY OR JEJUNOSTOMY  2003    failed ileoanal pouch anastomosis    OTHER SURGICAL HISTORY  2003    total proctocolectomy with ileoanal pouch-Dr. Liza Dickerson       Family History   Problem Relation Age of Onset    Stroke Father     Cancer Mother         lung cancer       Social History     Tobacco Use    Smoking status: Former    Smokeless tobacco: Never   Substance Use Topics    Alcohol use:  Yes       Allergies   Allergen Reactions    Adhesive Tape Rash       Current Facility-Administered Medications   Medication Dose Route Frequency    [START ON 10/17/2023] influenza quadrivalent split vaccine (FLUZONE;FLUARIX;FLULAVAL;AFLURIA) injection 0.5 mL  1 Dose IntraMUSCular Prior to discharge    fluticasone (FLONASE) 50 MCG/ACT nasal spray 1 spray  1 spray Each Nostril Daily    [START ON 10/17/2023] pantoprazole (PROTONIX) tablet 40 mg  40 mg Oral QAM AC    cetirizine (ZYRTEC) tablet 10 mg  10 mg Oral Daily    pravastatin (PRAVACHOL) tablet 80 mg  80 mg Oral Nightly    tamsulosin (FLOMAX) capsule 0.4 mg  0.4 mg Oral BID    sodium chloride flush 0.9 % injection 5-40 mL  5-40 mL IntraVENous 2 times per day    sodium chloride flush 0.9 % injection 5-40 mL  5-40 mL IntraVENous PRN    0.9 % sodium chloride infusion   IntraVENous PRN    ondansetron (ZOFRAN-ODT) disintegrating tablet 4 mg  4 mg Oral Q8H PRN    Or    ondansetron (ZOFRAN) injection 4 mg  4 mg IntraVENous Q6H PRN    polyethylene glycol (GLYCOLAX) packet 17 g  17 g Oral Daily PRN    acetaminophen (TYLENOL) tablet 650 mg  650 mg Oral Q6H PRN    Or    acetaminophen (TYLENOL) suppository 650 mg  650 mg Rectal Q6H PRN    heparin (porcine) injection 5,000 Units  5,000 Units SubCUTAneous 3 times per day    cefTRIAXone (ROCEPHIN) 1,000 mg in sterile water 10 mL IV syringe  1,000 mg IntraVENous Q24H    aspirin EC tablet 81 mg  81 mg Oral Daily     Facility-Administered Medications Ordered in Other Encounters   Medication Dose Route Frequency    acetaminophen (TYLENOL) tablet 975 mg

## 2023-10-17 VITALS
OXYGEN SATURATION: 100 % | SYSTOLIC BLOOD PRESSURE: 119 MMHG | HEART RATE: 94 BPM | WEIGHT: 205 LBS | RESPIRATION RATE: 16 BRPM | BODY MASS INDEX: 29.35 KG/M2 | DIASTOLIC BLOOD PRESSURE: 78 MMHG | TEMPERATURE: 97.9 F | HEIGHT: 70 IN

## 2023-10-17 PROCEDURE — 6370000000 HC RX 637 (ALT 250 FOR IP): Performed by: HOSPITALIST

## 2023-10-17 PROCEDURE — 6360000002 HC RX W HCPCS: Performed by: HOSPITALIST

## 2023-10-17 PROCEDURE — 94761 N-INVAS EAR/PLS OXIMETRY MLT: CPT

## 2023-10-17 PROCEDURE — 6370000000 HC RX 637 (ALT 250 FOR IP): Performed by: INTERNAL MEDICINE

## 2023-10-17 RX ADMIN — CETIRIZINE HYDROCHLORIDE 10 MG: 10 TABLET, FILM COATED ORAL at 09:15

## 2023-10-17 RX ADMIN — PANTOPRAZOLE SODIUM 40 MG: 40 TABLET, DELAYED RELEASE ORAL at 06:07

## 2023-10-17 RX ADMIN — ASPIRIN 81 MG: 81 TABLET, COATED ORAL at 09:15

## 2023-10-17 RX ADMIN — HEPARIN SODIUM 5000 UNITS: 5000 INJECTION INTRAVENOUS; SUBCUTANEOUS at 06:07

## 2023-10-17 RX ADMIN — TAMSULOSIN HYDROCHLORIDE 0.4 MG: 0.4 CAPSULE ORAL at 09:15

## 2023-10-30 NOTE — PROGRESS NOTES
Physician Progress Note      PATIENT:               Lisa Patel  [facility] #:                  [id number]  :                       1945  ADMIT DATE:       10/13/2023 1:42 PM  [facility] DATE:        10/17/2023 10:14 AM  RESPONDING  PROVIDER #:        Britt Lancaster MD          QUERY TEXT:    Good morning  Patient admitted with acute kidney injury. In order to support the diagnosis of GHASSAN, please include additional clinical   indicators in your documentation. ? Or please document if the diagnosis of GHASSAN   has been ruled out after further study. The medical record reflects the following:  Risk Factors: age, elevated creatinine  Clinical Indicators: Patient presented with weakness and fatigue. GHASSAN   documented throughout PN  DS \"GHASSAN (acute kidney injury) / CKD (chronic kidney disease) stage 3, GFR   30-59 ml/min POA: unclear if there is some element of volume depletion\"  Treatment: daily labs, IVF bolus, maintenance IVF    Thank you  Olivia Doty RN [facility]  [phone]    Defined by Kidney Disease Improving Global Outcomes (KDIGO) clinical practice   guideline for acute kidney injury:  -Increase in SCr by greater than or equal to 0.3 mg/dl within 48 hours; or  -Increase or decrease in SCr to greater than or equal to 1.5 times baseline,   which is known or presumed to have occurred within the prior 7 days; or  -Urine volume < 0.5ml/kg/h for 6 hours. Options provided:  -- Acute kidney injury evidenced by, Please document evidence as well as a   numerical baseline creatinine, if known. -- Currently resolved acute kidney injury was evidenced by, Please document   evidence as well as a numerical baseline creatinine, if known.   -- Acute kidney injury ruled out after study  -- Other - I will add my own diagnosis  -- Disagree - Not applicable / Not valid  -- Disagree - Clinically unable to determine / Unknown  -- Refer to Clinical Documentation Reviewer    PROVIDER RESPONSE TEXT:    Acute kidney injury was

## 2023-11-08 LAB — MICROALBUMIN/CREATININE RATIO, EXTERNAL: 7

## 2023-11-15 ENCOUNTER — OFFICE VISIT (OUTPATIENT)
Age: 78
End: 2023-11-15
Payer: MEDICARE

## 2023-11-15 VITALS
OXYGEN SATURATION: 99 % | BODY MASS INDEX: 30.09 KG/M2 | SYSTOLIC BLOOD PRESSURE: 114 MMHG | HEART RATE: 67 BPM | DIASTOLIC BLOOD PRESSURE: 55 MMHG | RESPIRATION RATE: 18 BRPM | HEIGHT: 70 IN | TEMPERATURE: 97.8 F | WEIGHT: 210.2 LBS

## 2023-11-15 DIAGNOSIS — N18.32 STAGE 3B CHRONIC KIDNEY DISEASE (HCC): ICD-10-CM

## 2023-11-15 DIAGNOSIS — I95.1 ORTHOSTATIC HYPOTENSION: Primary | ICD-10-CM

## 2023-11-15 LAB
ANION GAP SERPL CALC-SCNC: 5 MMOL/L (ref 5–15)
BUN SERPL-MCNC: 32 MG/DL (ref 6–20)
BUN/CREAT SERPL: 19 (ref 12–20)
CALCIUM SERPL-MCNC: 9.7 MG/DL (ref 8.5–10.1)
CHLORIDE SERPL-SCNC: 107 MMOL/L (ref 97–108)
CO2 SERPL-SCNC: 24 MMOL/L (ref 21–32)
CORTIS AM PEAK SERPL-MCNC: 14.8 UG/DL (ref 4.3–22.45)
CREAT SERPL-MCNC: 1.7 MG/DL (ref 0.7–1.3)
GLUCOSE SERPL-MCNC: 100 MG/DL (ref 65–100)
POTASSIUM SERPL-SCNC: 4.8 MMOL/L (ref 3.5–5.1)
SODIUM SERPL-SCNC: 136 MMOL/L (ref 136–145)
T4 FREE SERPL-MCNC: 1 NG/DL (ref 0.8–1.5)
TSH SERPL DL<=0.05 MIU/L-ACNC: 3.57 UIU/ML (ref 0.36–3.74)

## 2023-11-15 PROCEDURE — G8417 CALC BMI ABV UP PARAM F/U: HCPCS | Performed by: INTERNAL MEDICINE

## 2023-11-15 PROCEDURE — G8482 FLU IMMUNIZE ORDER/ADMIN: HCPCS | Performed by: INTERNAL MEDICINE

## 2023-11-15 PROCEDURE — G8427 DOCREV CUR MEDS BY ELIG CLIN: HCPCS | Performed by: INTERNAL MEDICINE

## 2023-11-15 PROCEDURE — 99204 OFFICE O/P NEW MOD 45 MIN: CPT | Performed by: INTERNAL MEDICINE

## 2023-11-15 PROCEDURE — 1111F DSCHRG MED/CURRENT MED MERGE: CPT | Performed by: INTERNAL MEDICINE

## 2023-11-15 PROCEDURE — 1036F TOBACCO NON-USER: CPT | Performed by: INTERNAL MEDICINE

## 2023-11-15 PROCEDURE — 1123F ACP DISCUSS/DSCN MKR DOCD: CPT | Performed by: INTERNAL MEDICINE

## 2023-11-15 PROCEDURE — 3074F SYST BP LT 130 MM HG: CPT | Performed by: INTERNAL MEDICINE

## 2023-11-15 PROCEDURE — 3078F DIAST BP <80 MM HG: CPT | Performed by: INTERNAL MEDICINE

## 2023-11-15 RX ORDER — FOLIC ACID 1 MG/1
1000 TABLET ORAL DAILY
COMMUNITY
Start: 2023-09-13

## 2023-11-15 RX ORDER — ASPIRIN 81 MG/1
81 TABLET, COATED ORAL DAILY
COMMUNITY
Start: 2023-09-13

## 2023-11-15 RX ORDER — LANOLIN ALCOHOL/MO/W.PET/CERES
1000 CREAM (GRAM) TOPICAL
COMMUNITY

## 2023-11-15 RX ORDER — METOPROLOL TARTRATE 50 MG/1
25 TABLET, FILM COATED ORAL 2 TIMES DAILY
COMMUNITY

## 2023-11-15 NOTE — ASSESSMENT & PLAN NOTE
Patient referred for evaluation of potential adrenal insufficiency given history of hyponatremia and hyperkalemia. However on most recent labs from 10/30/2023 these electrolytes are within normal limits. Random cortisol was at 6.9. Since hospital discharge, asymptomatic and feeling well. We will start off with a.m. cortisol, DHEA-S and ACTH level along with a repeat BMP. Check TSH, FT4.   If persistent concern for adrenal insufficiency we will then proceed with cosyntropin stimulation test.

## 2023-11-15 NOTE — PROGRESS NOTES
Nestor Jenkins is a 78 y.o. male here for   Chief Complaint   Patient presents with    New Patient     Referred for renal insufficient        1. Have you been to the ER, urgent care clinic since your last visit? Hospitalized since your last visit? - N/A    2. Have you seen or consulted any other health care providers outside of the [address] [place] [address] Avenue since your last visit?   Include any pap smears or colon screening.- N/A

## 2023-11-15 NOTE — PROGRESS NOTES
Endocrine Consultation    PCP: Jojo Mcmillan MD    Chief Complaint   Patient presents with    New Patient     Referred for adrenal insufficiency      HPI:  Samm Richardson is a 78 y.o. male with  has a past medical history of Anemia, At risk for sleep apnea, CAD (coronary artery disease), Essential hypertension, History of ileostomy, Hyperlipidemia, and Ulcerative colitis ([address] who is seen in consultation at the request of Jojo Mcmillan MD for evaluation of adrenal disorder. I reviewed patient's nephrology notes. He was last seen by his nephrologist on 10/30/2023. Due to low blood pressure, weight loss, hyponatremia and hyperkalemia nephrology was inquiring if he has adrenal insufficiency. Therefore he was referred to endocrinology. Labs from 10/30/2023 reveal a creatinine of 1.71, EGFR of 40, calcium of 9.6, glucose of 106, aldosterone of 168, renin of 24 with an aldosterone to renin ratio of 6.7. Cortisol was 6.9. CT of the chest abdomen pelvis from 5/20/2023 reveals unremarkable adrenals and pancreas. MRI brain from 10/13/2022 reveals no significant abnormality. Recent admission for fatigue and hypotension - discharged 10/17/23, per notes cardiology stopped all BP medications, GHASSAN 2/2 volume depletion corrected. Then nephrology evaluation as above. Today feels well.    CKD, per pt likely 2/2 volume depletion following colectomy, per renal suspect orthostatic hypotension   40 lb weight loss over 1 year     Today no dizziness with standing   No recent corticosteroid use   No hx of thyroid disease     Full ROS completed this visit:  +weight loss  Unable to assess diarrhea/constipation w/ileostomy, though loose   Negative for weight gain, fevers, chills, blurry vision, changes in vision, chest pain, palpitations, leg swelling, shortness of breath, wheezing, snoring, abdominal pain, nausea, vomiting,frequent urination, dysuria, tremors, fainting, shakes, cold intolerance, hot intolerance,

## 2023-11-16 LAB — ACTH PLAS-MCNC: 8.5 PG/ML (ref 7.2–63.3)

## 2023-11-17 ENCOUNTER — TELEPHONE (OUTPATIENT)
Age: 78
End: 2023-11-17

## 2023-11-17 LAB — DHEA-S SERPL-MCNC: 18.7 UG/DL (ref 20.8–226.4)

## 2023-11-17 NOTE — TELEPHONE ENCOUNTER
----- Message from Kayode Gregorio MD sent at 11/17/2023  9:34 AM EST -----  Patient is not signed up for portal   Please call to inform   Kidney levels are lower than before, stay hydrated and follow up with kidney specialist   Sodium and potassium are normal   Your cortisol levels are abnormal, further testing with Cosyntropin Stimulation Test is needed. Please help patient to schedule the cosyntropin stimulation test, complete prior to follow up.

## 2023-11-17 NOTE — TELEPHONE ENCOUNTER
Called patient to go over results as notes in previous encounter with patient. Patient understood with no questions.

## 2023-11-17 NOTE — RESULT ENCOUNTER NOTE
Patient is not signed up for portal   Please call to inform   Kidney levels are lower than before, stay hydrated and follow up with kidney specialist   Sodium and potassium are normal   Your cortisol levels are abnormal, further testing with Cosyntropin Stimulation Test is needed. Please help patient to schedule the cosyntropin stimulation test, complete prior to follow up.

## 2023-11-17 NOTE — TELEPHONE ENCOUNTER
Per Dr. Jacuqeline Fernández, informed pt of result note, as noted above. Pt verbalized understanding with no further questions or concerns at this time.

## 2023-11-24 DIAGNOSIS — I95.1 ORTHOSTATIC HYPOTENSION: Primary | ICD-10-CM

## 2023-11-24 RX ORDER — ONDANSETRON 2 MG/ML
8 INJECTION INTRAMUSCULAR; INTRAVENOUS
OUTPATIENT
Start: 2023-11-30

## 2023-11-24 RX ORDER — ALBUTEROL SULFATE 90 UG/1
4 AEROSOL, METERED RESPIRATORY (INHALATION) PRN
OUTPATIENT
Start: 2023-11-30

## 2023-11-24 RX ORDER — FAMOTIDINE 10 MG/ML
20 INJECTION, SOLUTION INTRAVENOUS
OUTPATIENT
Start: 2023-11-30

## 2023-11-24 RX ORDER — DIPHENHYDRAMINE HYDROCHLORIDE 50 MG/ML
50 INJECTION INTRAMUSCULAR; INTRAVENOUS
OUTPATIENT
Start: 2023-11-30

## 2023-11-24 RX ORDER — ACETAMINOPHEN 325 MG/1
650 TABLET ORAL
OUTPATIENT
Start: 2023-11-30

## 2023-11-24 RX ORDER — SODIUM CHLORIDE 9 MG/ML
5-250 INJECTION, SOLUTION INTRAVENOUS PRN
OUTPATIENT
Start: 2023-11-30

## 2023-11-24 RX ORDER — EPINEPHRINE 1 MG/ML
0.3 INJECTION, SOLUTION, CONCENTRATE INTRAVENOUS PRN
OUTPATIENT
Start: 2023-11-30

## 2023-11-24 RX ORDER — SODIUM CHLORIDE 9 MG/ML
INJECTION, SOLUTION INTRAVENOUS CONTINUOUS
OUTPATIENT
Start: 2023-11-30

## 2023-11-24 RX ORDER — HEPARIN SODIUM (PORCINE) LOCK FLUSH IV SOLN 100 UNIT/ML 100 UNIT/ML
500 SOLUTION INTRAVENOUS PRN
OUTPATIENT
Start: 2023-11-30

## 2023-11-24 RX ORDER — SODIUM CHLORIDE 0.9 % (FLUSH) 0.9 %
5-40 SYRINGE (ML) INJECTION PRN
OUTPATIENT
Start: 2023-11-30

## 2023-11-30 ENCOUNTER — HOSPITAL ENCOUNTER (OUTPATIENT)
Facility: HOSPITAL | Age: 78
Setting detail: INFUSION SERIES
Discharge: HOME | End: 2023-11-30
Payer: MEDICARE

## 2023-11-30 VITALS
HEIGHT: 70 IN | BODY MASS INDEX: 29.56 KG/M2 | RESPIRATION RATE: 18 BRPM | DIASTOLIC BLOOD PRESSURE: 59 MMHG | OXYGEN SATURATION: 100 % | WEIGHT: 206.5 LBS | HEART RATE: 57 BPM | SYSTOLIC BLOOD PRESSURE: 98 MMHG | TEMPERATURE: 97.7 F

## 2023-11-30 DIAGNOSIS — I95.1 ORTHOSTATIC HYPOTENSION: Primary | ICD-10-CM

## 2023-11-30 LAB
CORTIS 1H P CHAL SERPL-MCNC: 48.6 UG/DL
CORTIS 30M P CHAL SERPL-MCNC: 37.2 UG/DL
CORTIS BS SERPL-MCNC: 19.9 UG/DL

## 2023-11-30 PROCEDURE — 82024 ASSAY OF ACTH: CPT

## 2023-11-30 PROCEDURE — 2580000003 HC RX 258: Performed by: INTERNAL MEDICINE

## 2023-11-30 PROCEDURE — A4216 STERILE WATER/SALINE, 10 ML: HCPCS | Performed by: INTERNAL MEDICINE

## 2023-11-30 PROCEDURE — 6360000002 HC RX W HCPCS: Performed by: INTERNAL MEDICINE

## 2023-11-30 PROCEDURE — 82533 TOTAL CORTISOL: CPT

## 2023-11-30 PROCEDURE — 96374 THER/PROPH/DIAG INJ IV PUSH: CPT

## 2023-11-30 PROCEDURE — 36415 COLL VENOUS BLD VENIPUNCTURE: CPT

## 2023-11-30 RX ORDER — SODIUM CHLORIDE 0.9 % (FLUSH) 0.9 %
5-40 SYRINGE (ML) INJECTION PRN
OUTPATIENT
Start: 2023-11-30

## 2023-11-30 RX ORDER — SODIUM CHLORIDE 9 MG/ML
INJECTION, SOLUTION INTRAVENOUS CONTINUOUS
OUTPATIENT
Start: 2023-11-30

## 2023-11-30 RX ORDER — ALBUTEROL SULFATE 90 UG/1
4 AEROSOL, METERED RESPIRATORY (INHALATION) PRN
OUTPATIENT
Start: 2023-11-30

## 2023-11-30 RX ORDER — HEPARIN 100 UNIT/ML
500 SYRINGE INTRAVENOUS PRN
OUTPATIENT
Start: 2023-11-30

## 2023-11-30 RX ORDER — ACETAMINOPHEN 325 MG/1
650 TABLET ORAL
OUTPATIENT
Start: 2023-11-30

## 2023-11-30 RX ORDER — DIPHENHYDRAMINE HYDROCHLORIDE 50 MG/ML
50 INJECTION INTRAMUSCULAR; INTRAVENOUS
OUTPATIENT
Start: 2023-11-30

## 2023-11-30 RX ORDER — SODIUM CHLORIDE 9 MG/ML
5-250 INJECTION, SOLUTION INTRAVENOUS PRN
OUTPATIENT
Start: 2023-11-30

## 2023-11-30 RX ORDER — EPINEPHRINE 1 MG/ML
0.3 INJECTION, SOLUTION, CONCENTRATE INTRAVENOUS PRN
OUTPATIENT
Start: 2023-11-30

## 2023-11-30 RX ORDER — ONDANSETRON 2 MG/ML
8 INJECTION INTRAMUSCULAR; INTRAVENOUS
OUTPATIENT
Start: 2023-11-30

## 2023-11-30 RX ADMIN — COSYNTROPIN 250 MCG: 0.25 INJECTION, POWDER, LYOPHILIZED, FOR SOLUTION INTRAMUSCULAR; INTRAVENOUS at 08:55

## 2023-11-30 ASSESSMENT — PAIN SCALES - GENERAL: PAINLEVEL_OUTOF10: 0

## 2023-11-30 NOTE — PROGRESS NOTES
[facility] Progress Note    Date: 2023    Name: Vicente Manzano    MRN: [id number]         : 1945    Mr. Luh Ramirez Arrived ambulatory and in no distress for Cortisol Stimulation test Regimen. Assessment was completed, no acute issues at this time, no new complaints voiced. Peripheral IV 24 g established in left arm with positive blood return. Baseline labs obtained. Labs obtained 30 minutes post medicine given and again at 60 minutes. Results in CC. Mr. Shanique Jefferson vitals were reviewed. Vitals:    23 0805   BP: (!) 98/59   Pulse: 57   Resp: 18   Temp:    SpO2:        Medications:  Medications Administered         cosyntropin (CORTROSYN) 250 mcg in sodium chloride (PF) 0.9 % 2 mL syringe Admin Date  2023 Action  Given Dose  250 mcg Route  IntraVENous Administered By  Miguel Bonilla RN             Mr. Luh Ramirez tolerated treatment well and was discharged from [address] [place] [address] in stable condition. PIV taken out with no issues, pressure applied, wrapped in 2x2 gauze and coban. Patient does not have any further scheduled appointment at this time.

## 2023-12-01 LAB — ACTH PLAS-MCNC: 14.6 PG/ML (ref 7.2–63.3)

## 2023-12-06 ENCOUNTER — OFFICE VISIT (OUTPATIENT)
Age: 78
End: 2023-12-06
Payer: MEDICARE

## 2023-12-06 VITALS
RESPIRATION RATE: 18 BRPM | HEIGHT: 70 IN | BODY MASS INDEX: 30.26 KG/M2 | DIASTOLIC BLOOD PRESSURE: 58 MMHG | WEIGHT: 211.4 LBS | TEMPERATURE: 97.2 F | SYSTOLIC BLOOD PRESSURE: 120 MMHG | HEART RATE: 70 BPM

## 2023-12-06 DIAGNOSIS — I95.1 ORTHOSTATIC HYPOTENSION: Primary | ICD-10-CM

## 2023-12-06 DIAGNOSIS — N17.9 AKI (ACUTE KIDNEY INJURY) (HCC): ICD-10-CM

## 2023-12-06 PROCEDURE — G8427 DOCREV CUR MEDS BY ELIG CLIN: HCPCS | Performed by: INTERNAL MEDICINE

## 2023-12-06 PROCEDURE — 1123F ACP DISCUSS/DSCN MKR DOCD: CPT | Performed by: INTERNAL MEDICINE

## 2023-12-06 PROCEDURE — 3074F SYST BP LT 130 MM HG: CPT | Performed by: INTERNAL MEDICINE

## 2023-12-06 PROCEDURE — G8417 CALC BMI ABV UP PARAM F/U: HCPCS | Performed by: INTERNAL MEDICINE

## 2023-12-06 PROCEDURE — 3078F DIAST BP <80 MM HG: CPT | Performed by: INTERNAL MEDICINE

## 2023-12-06 PROCEDURE — G8482 FLU IMMUNIZE ORDER/ADMIN: HCPCS | Performed by: INTERNAL MEDICINE

## 2023-12-06 PROCEDURE — 99214 OFFICE O/P EST MOD 30 MIN: CPT | Performed by: INTERNAL MEDICINE

## 2023-12-06 PROCEDURE — 1036F TOBACCO NON-USER: CPT | Performed by: INTERNAL MEDICINE

## 2023-12-06 RX ORDER — DEXAMETHASONE 1 MG
1 TABLET ORAL ONCE
Qty: 1 TABLET | Refills: 0 | Status: SHIPPED | OUTPATIENT
Start: 2023-12-06 | End: 2023-12-06

## 2023-12-06 NOTE — PROGRESS NOTES
Follow up     PCP: Olivia Tobar MD    HPI:  Nilsa Capone is a 78 y.o. male with  has a past medical history of Anemia, At risk for sleep apnea, CAD (coronary artery disease), Essential hypertension, History of ileostomy, Hyperlipidemia, and Ulcerative colitis ([address] here for follow up of adrenal disorder. Since last visit  Lab reviewed today   11/2023   Tfts nl   Serum Na/K NL, egfr 40   Dheas low at 18.7   Am cortisol 14.8, acth 8.5   Cosyntropin stim       Component Ref Range & Units 11/30/23 0838   Cortisol, Base ug/dL 19.9   Comment: No reference range has been established. Cortisol, 30 Min ug/dL 37.2   Comment: No reference range has been established. Cortisol, 60 Min ug/dL 48.6   Comment: No reference range has been established. [address]        Initial visit notes 11/2023 -   I reviewed patient's nephrology notes. He was last seen by his nephrologist on 10/30/2023. Due to low blood pressure, weight loss, hyponatremia and hyperkalemia nephrology was inquiring if he has adrenal insufficiency. Therefore he was referred to endocrinology. Labs from 10/30/2023 reveal a creatinine of 1.71, EGFR of 40, calcium of 9.6, glucose of 106, aldosterone of 168, renin of 24 with an aldosterone to renin ratio of 6.7. Cortisol was 6.9. CT of the chest abdomen pelvis from 5/20/2023 reveals unremarkable adrenals and pancreas. MRI brain from 10/13/2022 reveals no significant abnormality. Recent admission for fatigue and hypotension - discharged 10/17/23, per notes cardiology stopped all BP medications, GHASSAN 2/2 volume depletion corrected. Then nephrology evaluation as above. Today feels well.    CKD, per pt likely 2/2 volume depletion following colectomy, per renal suspect orthostatic hypotension   40 lb weight loss over 1 year     Today no dizziness with standing   No recent corticosteroid use   No hx of thyroid disease       LABS/STUDIES:     No results found for:

## 2023-12-06 NOTE — PATIENT INSTRUCTIONS
DEXAMETHASONE SUPPRESSION TEST: PATIENT INSTRUCTIONS   1. Take 1 mg of Dexamethasone by mouth between 11pm and midnight, it has been sent to your pharmacy. 2. The very next morning, you are to have the ordered blood tests drawn between 8 am and 9 am.     If the blood tests are not drawn at the right time, this may make the test results invalid, and so may need to be repeated.    If you are currently taking steroids, please let your doctor know, as this may interfere with the test

## 2023-12-06 NOTE — PROGRESS NOTES
Samm Richardson is a 78 y.o. male here for   Chief Complaint   Patient presents with    Follow-up     Orthostatic hypotension       1. Have you been to the ER, urgent care clinic since your last visit? Hospitalized since your last visit? - no     2. Have you seen or consulted any other health care providers outside of the [address] [place] [address] Avenue since your last visit?   Include any pap smears or colon screening.- no

## 2023-12-07 NOTE — ASSESSMENT & PLAN NOTE
Patient referred for evaluation of potential adrenal insufficiency given history of hyponatremia and hyperkalemia. However on most recent labs from 10/30/2023 these electrolytes are within normal limits. Random cortisol was at 6.9. Since hospital discharge, asymptomatic and feeling well. Baseline cortisol >18 and cosyntropin stim NL  Dheas low   Cannot rule out non acth dependent cortisol excess, though clinically w/o cortisol excess  Proceed w/1 mg DST  As long as the 1 mg dexamethasone suppression test is normal, and as serum electrolytes have now normalized, notably the potassium and sodium and adrenal insufficiency has been ruled out with a normal cosyntropin stimulation test and baseline cortisol of greater than 18, I have advised further follow-up with nephrology for CKD and orthostatic hypotension. Should there be further concerns regarding his adrenal function by renal I have advised to return to endocrinology clinic.

## 2023-12-15 ENCOUNTER — NURSE ONLY (OUTPATIENT)
Age: 78
End: 2023-12-15

## 2023-12-15 DIAGNOSIS — I95.1 ORTHOSTATIC HYPOTENSION: ICD-10-CM

## 2023-12-15 LAB — CORTIS AM PEAK SERPL-MCNC: 3.6 UG/DL (ref 4.3–22.45)

## 2023-12-18 NOTE — RESULT ENCOUNTER NOTE
Please inform your testing reveals higher than normal cortisol levels  Please follow up within 6-8 weeks to review

## 2024-02-07 ENCOUNTER — OFFICE VISIT (OUTPATIENT)
Age: 79
End: 2024-02-07
Payer: MEDICARE

## 2024-02-07 VITALS
HEART RATE: 66 BPM | SYSTOLIC BLOOD PRESSURE: 125 MMHG | OXYGEN SATURATION: 99 % | DIASTOLIC BLOOD PRESSURE: 66 MMHG | WEIGHT: 207.3 LBS | HEIGHT: 70 IN | BODY MASS INDEX: 29.68 KG/M2 | TEMPERATURE: 97 F

## 2024-02-07 DIAGNOSIS — I95.1 ORTHOSTATIC HYPOTENSION: Primary | ICD-10-CM

## 2024-02-07 PROCEDURE — 99213 OFFICE O/P EST LOW 20 MIN: CPT | Performed by: INTERNAL MEDICINE

## 2024-02-07 PROCEDURE — 3074F SYST BP LT 130 MM HG: CPT | Performed by: INTERNAL MEDICINE

## 2024-02-07 PROCEDURE — G8417 CALC BMI ABV UP PARAM F/U: HCPCS | Performed by: INTERNAL MEDICINE

## 2024-02-07 PROCEDURE — G8427 DOCREV CUR MEDS BY ELIG CLIN: HCPCS | Performed by: INTERNAL MEDICINE

## 2024-02-07 PROCEDURE — 3078F DIAST BP <80 MM HG: CPT | Performed by: INTERNAL MEDICINE

## 2024-02-07 PROCEDURE — 1036F TOBACCO NON-USER: CPT | Performed by: INTERNAL MEDICINE

## 2024-02-07 PROCEDURE — G8482 FLU IMMUNIZE ORDER/ADMIN: HCPCS | Performed by: INTERNAL MEDICINE

## 2024-02-07 PROCEDURE — 1123F ACP DISCUSS/DSCN MKR DOCD: CPT | Performed by: INTERNAL MEDICINE

## 2024-02-07 NOTE — PROGRESS NOTES
Gary Canseco Jr is a 78 y.o. male here for   Chief Complaint   Patient presents with    Other    Hypotension     Orthostatic hypotension, GHASSAN Acute kidney injury       1. Have you been to the ER, urgent care clinic since your last visit?  Hospitalized since your last visit? -no    2. Have you seen or consulted any other health care providers outside of the [facility] since your last visit?  Include any pap smears or colon screening.-no

## 2024-02-07 NOTE — ASSESSMENT & PLAN NOTE
Patient referred for evaluation of potential adrenal insufficiency given history of hyponatremia and hyperkalemia.    However labs from 10/30/2023 these electrolytes are within normal limits.    Random cortisol was at 6.9.   Since hospital discharge, asymptomatic and feeling well.   Baseline cortisol >18 and cosyntropin stim NL  Dheas low   Cannot rule out non acth dependent cortisol excess, though clinically w/o cortisol excess  Dex supression abnormal with AM cortisol 3.6   Adrenal CT 5/2023 no mass or abnormality. MRI brain 10/2023 - no abn reported.   Given age and lack of clinical cushings, plan to monitor with repeat salivary cortisol/dex suppression in ~8 mo   S1-S2

## 2024-02-07 NOTE — PROGRESS NOTES
Follow up     PCP: Gerald Rosado MD    HPI:  Gary Canseco Jr is a 78 y.o. male with  has a past medical history of Anemia, At risk for sleep apnea, CAD (coronary artery disease), Essential hypertension, History of ileostomy, Hyperlipidemia, and Ulcerative colitis (HCC).here for follow up of adrenal disorder.     Here to follow up on abn 1 mg dst at 3.6  BRIEF ROS   Gen: no fevers/chills  CV: no chest pain  Resp: no shortness of breath, cough   GI: no nausea, emesis, abdominal pain  Neuro: no confusion     11/2023   Tfts nl   Serum Na/K NL, egfr 40   Dheas low at 18.7   Am cortisol 14.8, acth 8.5   Cosyntropin stim       Component Ref Range & Units 11/30/23 0838   Cortisol, Base ug/dL 19.9   Comment: No reference range has been established.   Cortisol, 30 Min ug/dL 37.2   Comment: No reference range has been established.   Cortisol, 60 Min ug/dL 48.6   Comment: No reference range has been established.   Resulting Agency  [facility]        Initial visit notes 11/2023 -   I reviewed patient's nephrology notes.  He was last seen by his nephrologist on 10/30/2023.  Due to low blood pressure, weight loss, hyponatremia and hyperkalemia nephrology was inquiring if he has adrenal insufficiency.  Therefore he was referred to endocrinology.  Labs from 10/30/2023 reveal a creatinine of 1.71, EGFR of 40, calcium of 9.6, glucose of 106, aldosterone of 168, renin of 24 with an aldosterone to renin ratio of 6.7.  Cortisol was 6.9.  CT of the chest abdomen pelvis from 5/20/2023 reveals unremarkable adrenals and pancreas.  MRI brain from 10/13/2022 reveals no significant abnormality.    Recent admission for fatigue and hypotension - discharged 10/17/23, per notes cardiology stopped all BP medications, GHSASAN 2/2 volume depletion corrected. Then nephrology evaluation as above.     Today feels well.   CKD, per pt likely 2/2 volume depletion following colectomy, per renal suspect orthostatic hypotension   40 lb weight loss over

## 2024-10-09 ENCOUNTER — OFFICE VISIT (OUTPATIENT)
Age: 79
End: 2024-10-09
Payer: MEDICARE

## 2024-10-09 VITALS
DIASTOLIC BLOOD PRESSURE: 63 MMHG | HEIGHT: 70 IN | HEART RATE: 60 BPM | BODY MASS INDEX: 28.43 KG/M2 | TEMPERATURE: 97.9 F | WEIGHT: 198.6 LBS | OXYGEN SATURATION: 98 % | SYSTOLIC BLOOD PRESSURE: 114 MMHG

## 2024-10-09 DIAGNOSIS — R79.89 ABNORMAL CORTISOL LEVEL: Primary | ICD-10-CM

## 2024-10-09 PROCEDURE — 3074F SYST BP LT 130 MM HG: CPT | Performed by: INTERNAL MEDICINE

## 2024-10-09 PROCEDURE — 3078F DIAST BP <80 MM HG: CPT | Performed by: INTERNAL MEDICINE

## 2024-10-09 PROCEDURE — G8428 CUR MEDS NOT DOCUMENT: HCPCS | Performed by: INTERNAL MEDICINE

## 2024-10-09 PROCEDURE — 99213 OFFICE O/P EST LOW 20 MIN: CPT | Performed by: INTERNAL MEDICINE

## 2024-10-09 PROCEDURE — 1036F TOBACCO NON-USER: CPT | Performed by: INTERNAL MEDICINE

## 2024-10-09 PROCEDURE — G8417 CALC BMI ABV UP PARAM F/U: HCPCS | Performed by: INTERNAL MEDICINE

## 2024-10-09 PROCEDURE — 1123F ACP DISCUSS/DSCN MKR DOCD: CPT | Performed by: INTERNAL MEDICINE

## 2024-10-09 PROCEDURE — G8484 FLU IMMUNIZE NO ADMIN: HCPCS | Performed by: INTERNAL MEDICINE

## 2024-10-09 RX ORDER — DEXAMETHASONE 1 MG
TABLET ORAL
Qty: 1 TABLET | Refills: 0 | Status: SHIPPED | OUTPATIENT
Start: 2024-10-09

## 2024-10-09 NOTE — PROGRESS NOTES
Gary Canseco Jr is a 79 y.o. male here for   Chief Complaint   Patient presents with    Orthostatic Hypotension       1. Have you been to the ER, urgent care clinic since your last visit?  Hospitalized since your last visit? -NO    2. Have you seen or consulted any other health care providers outside of the [facility] since your last visit?  Include any pap smears or colon screening.-Pt stated he saw the nephrologist last week.

## 2024-10-09 NOTE — PROGRESS NOTES
Follow up     PCP: Gerald Rosado MD    HPI:  Gary Canseco Jr is a 79 y.o. male with  has a past medical history of Anemia, At risk for sleep apnea, CAD (coronary artery disease), Essential hypertension, History of ileostomy, Hyperlipidemia, and Ulcerative colitis (HCC).here for follow up of adrenal disorder.     Today  Labs 9/2024 - serum Na NL  CKD 3b noted on labs     2/2024  Here to follow up on abn 1 mg dst at 3.6     11/2023   Tfts nl   Serum Na/K NL, egfr 40   Dheas low at 18.7   Am cortisol 14.8, acth 8.5   Cosyntropin stim       Component Ref Range & Units 11/30/23 0838   Cortisol, Base ug/dL 19.9   Comment: No reference range has been established.   Cortisol, 30 Min ug/dL 37.2   Comment: No reference range has been established.   Cortisol, 60 Min ug/dL 48.6   Comment: No reference range has been established.   Resulting Agency  [facility]        Initial visit notes 11/2023 -   I reviewed patient's nephrology notes.  He was last seen by his nephrologist on 10/30/2023.  Due to low blood pressure, weight loss, hyponatremia and hyperkalemia nephrology was inquiring if he has adrenal insufficiency.  Therefore he was referred to endocrinology.  Labs from 10/30/2023 reveal a creatinine of 1.71, EGFR of 40, calcium of 9.6, glucose of 106, aldosterone of 168, renin of 24 with an aldosterone to renin ratio of 6.7.  Cortisol was 6.9.  CT of the chest abdomen pelvis from 5/20/2023 reveals unremarkable adrenals and pancreas.  MRI brain from 10/13/2022 reveals no significant abnormality.    Recent admission for fatigue and hypotension - discharged 10/17/23, per notes cardiology stopped all BP medications, GHASSAN 2/2 volume depletion corrected. Then nephrology evaluation as above.     Today feels well.   CKD, per pt likely 2/2 volume depletion following colectomy, per renal suspect orthostatic hypotension   40 lb weight loss over 1 year     Today no dizziness with standing   No recent corticosteroid use   No hx

## 2024-10-09 NOTE — PATIENT INSTRUCTIONS
DEXAMETHASONE SUPPRESSION TEST: PATIENT INSTRUCTIONS   1. Take 1 mg of Dexamethasone by mouth between 11pm and midnight, it has been sent to your pharmacy.     2. The very next morning, you are to have the ordered blood tests drawn between 7 am and 8:30 am.     If the blood tests are not drawn at the right time, this may make the test results invalid, and so may need to be repeated.   If you are currently taking steroids, please let your doctor know, as this may interfere with the test

## 2024-10-23 ENCOUNTER — LAB (OUTPATIENT)
Age: 79
End: 2024-10-23

## 2024-10-23 DIAGNOSIS — R79.89 ABNORMAL CORTISOL LEVEL: ICD-10-CM

## 2024-10-23 LAB — CORTIS AM PEAK SERPL-MCNC: 2.9 UG/DL (ref 4.3–22.45)

## 2025-02-07 ENCOUNTER — TELEPHONE (OUTPATIENT)
Age: 80
End: 2025-02-07

## 2025-02-07 NOTE — TELEPHONE ENCOUNTER
Informed pt of result note, as noted above. Pt verbalized understanding with no further questions or concerns at this time.

## 2025-02-07 NOTE — TELEPHONE ENCOUNTER
----- Message from Dr. Ratna Archuleta MD sent at 2/7/2025  9:23 AM EST -----  Please call to inform.   Your cortisol level, after taking dexamethasone, is slightly higher than usual. However, this is likely not something to be overly concerned about right now.  Cushing's syndrome is a condition we monitor for, and it can have symptoms such as weight gain, particularly around the abdomen, thinning skin that bruises easily, high blood pressure, and muscle weakness. If you notice any of these symptoms developing, please contact our office.  For now, let's plan to see you in one year to review how you are feeling and to check on any symptoms you might experience. Of course, if you have any concerns or new symptoms before then, don't hesitate to reach out to us.

## 2025-05-07 ENCOUNTER — HOSPITAL ENCOUNTER (OUTPATIENT)
Facility: HOSPITAL | Age: 80
Discharge: HOME | End: 2025-05-10
Payer: MEDICARE

## 2025-05-07 DIAGNOSIS — R35.1 NOCTURIA: ICD-10-CM

## 2025-05-07 DIAGNOSIS — N13.8 BPH WITH OBSTRUCTION/LOWER URINARY TRACT SYMPTOMS: ICD-10-CM

## 2025-05-07 DIAGNOSIS — N21.0 BLADDER STONE: ICD-10-CM

## 2025-05-07 DIAGNOSIS — N40.1 BPH WITH OBSTRUCTION/LOWER URINARY TRACT SYMPTOMS: ICD-10-CM

## 2025-05-07 PROCEDURE — 76770 US EXAM ABDO BACK WALL COMP: CPT

## (undated) DEVICE — SUTURE PDS II SZ 1 L96IN ABSRB VLT TP-1 L65MM 1/2 CIR Z880G

## (undated) DEVICE — LIGHT HANDLE: Brand: DEVON

## (undated) DEVICE — STAPLER INT L75MM CUT LN L73MM STPL LN L77MM BLU B FRM 8

## (undated) DEVICE — SUTURE VCRL SZ 3-0 L27IN ABSRB UD L26MM SH 1/2 CIR J416H

## (undated) DEVICE — Device: Brand: SENSURA MIO

## (undated) DEVICE — CONTINU-FLO SOLUTION SET, 2 INJECTION SITES, MALE LUER LOCK ADAPTER WITH RETRACTABLE COLLAR, LARGE BORE STOPCOCK WITH ROTATING MALE LUER LOCK EXTENSION SET, 2 INJECTION SITES, MALE LUER LOCK ADAPTER WITH RETRACTABLE COLLAR: Brand: INTERLINK/CONTINU-FLO

## (undated) DEVICE — DBD-PACK,LAPAROTOMY,2 REINFORCED GOWNS: Brand: MEDLINE

## (undated) DEVICE — INTENDED FOR TISSUE SEPARATION, AND OTHER PROCEDURES THAT REQUIRE A SHARP SURGICAL BLADE TO PUNCTURE OR CUT.: Brand: BARD-PARKER ® CARBON RIB-BACK BLADES

## (undated) DEVICE — STAPLER SKIN H3.9MM WIRE DIA0.58MM CRWN 6.9MM 35 STPL ROT

## (undated) DEVICE — COVER LT HNDL PLAS RIG 1 PER PK

## (undated) DEVICE — RESERVOIR,SUCTION,100CC,SILICONE: Brand: MEDLINE

## (undated) DEVICE — 3M™ MEDIPORE™ H SOFT CLOTH TAPE SHORT ROLL TAPE 6INCHES X 2 YARDS 16 ROLLS/CASE 2866S: Brand: 3M™ MEDIPORE™

## (undated) DEVICE — SUTURE NONABSORBABLE MONOFILAMENT 2-0 FS 18 IN ETHILON 664H

## (undated) DEVICE — DRAPE THERMABASIN INTRATEMP --

## (undated) DEVICE — 3M™ TEGADERM™ TRANSPARENT FILM DRESSING FRAME STYLE, 1624W, 2-3/8 IN X 2-3/4 IN (6 CM X 7 CM), 100/CT 4CT/CASE: Brand: 3M™ TEGADERM™

## (undated) DEVICE — 1200 GUARD II KIT W/5MM TUBE W/O VAC TUBE: Brand: GUARDIAN

## (undated) DEVICE — LAPAROTOMY-SFMC: Brand: MEDLINE INDUSTRIES, INC.

## (undated) DEVICE — POOLE SUCTION INSTRUMENT WITH REMOVABLE SHEATH AND PREATTACHED 6' (1.8 M) CLEAR PLASTIC TUBING: Brand: POOLE

## (undated) DEVICE — LARGE, DISPOSABLE ALEXIS O C-SECTION PROTECTOR - RETRACTOR: Brand: ALEXIS ® O C-SECTION PROTECTOR - RETRACTOR

## (undated) DEVICE — NEEDLE HYPO 25GA L1.5IN BVL ORIENTED ECLIPSE

## (undated) DEVICE — SUTURE VCRL SZ 4-0 L18IN ABSRB UD L19MM PS-2 3/8 CIR PRIM J496H

## (undated) DEVICE — ADHESIVE LIQ 3ML VIAL -- MASTISOL

## (undated) DEVICE — SUTURE PERMAHAND SZ 3-0 L18IN NONABSORBABLE BLK L26MM SH C013D

## (undated) DEVICE — BLADE ES L6IN ELASTOMERIC COAT EXT DURABLE BEND UPTO 90DEG

## (undated) DEVICE — STERILE POLYISOPRENE POWDER-FREE SURGICAL GLOVES: Brand: PROTEXIS

## (undated) DEVICE — MARKER,SKIN,WI/RULER AND LABELS: Brand: MEDLINE

## (undated) DEVICE — SPONGE GZ W4XL4IN COT 12 PLY TYP VII WVN C FLD DSGN STERILE

## (undated) DEVICE — SUTURE VCRL SZ 2-0 L27IN ABSRB UD L26MM SH 1/2 CIR J417H

## (undated) DEVICE — INFECTION CONTROL KIT SYS

## (undated) DEVICE — ROCKER SWITCH PENCIL BLADE ELECTRODE, HOLSTER: Brand: EDGE

## (undated) DEVICE — RELOAD STPL L75MM OPN H3.8MM CLS 1.5MM WIRE DIA0.2MM REG

## (undated) DEVICE — COLON CLOSING PACK: Brand: MEDLINE INDUSTRIES, INC.

## (undated) DEVICE — DERMABOND SKIN ADH 0.7ML -- DERMABOND ADVANCED 12/BX

## (undated) DEVICE — Z DISCONTINUEDSOLUTION PREP 2OZ 10% POVIDONE IOD SCR CAP BTL

## (undated) DEVICE — SOLUTION IRRIG 1000ML 0.9% SOD CHL USP POUR PLAS BTL

## (undated) DEVICE — REM POLYHESIVE ADULT PATIENT RETURN ELECTRODE: Brand: VALLEYLAB

## (undated) DEVICE — SUT VCRL + 2-0 36IN CT1 UD --

## (undated) DEVICE — DRAPE,LAPAROTOMY,PED,STERILE: Brand: MEDLINE

## (undated) DEVICE — SEALER TISS CRV LG JAW -- ENSEAL X1

## (undated) DEVICE — JELLY,LUBE,STERILE,FLIP TOP,TUBE,4-OZ: Brand: MEDLINE

## (undated) DEVICE — KENDALL DL ECG CABLE AND LEAD WIRE SYSTEM, 3-LEAD, SINGLE PATIENT USE: Brand: KENDALL

## (undated) DEVICE — 3M™ TEGADERM™ TRANSPARENT FILM DRESSING FRAME STYLE, 1626W, 4 IN X 4-3/4 IN (10 CM X 12 CM), 50/CT 4CT/CASE: Brand: 3M™ TEGADERM™

## (undated) DEVICE — SUTURE VCRL SZ 0 L36IN ABSRB UD L36MM CT-1 1/2 CIR J946H

## (undated) DEVICE — SUTURE VCRL + SZ 3-0 L18IN ABSRB UD SH 1/2 CIR TAPERCUT NDL VCP864D

## (undated) DEVICE — SOLUTION LACTATED RINGERS INJECTION USP

## (undated) DEVICE — GAUZE BORDERED 4X8 --

## (undated) DEVICE — SPONGE LAPAROTOMY W18XL18IN WHITE STRUNG RADIOPAQUE STERILE

## (undated) DEVICE — DRAIN SURG W10XL20CM SIL SMOOTH FLAT 3/4 PERF DBL WRP

## (undated) DEVICE — SYR 10ML LUER LOK 1/5ML GRAD --

## (undated) DEVICE — Device

## (undated) DEVICE — GLOVE SURG SZ 65 THK91MIL LTX FREE SYN POLYISOPRENE

## (undated) DEVICE — SOLUTION IV 1000ML 0.9% SOD CHL

## (undated) DEVICE — SUT PDS II 0 36IN CT1 VIO --

## (undated) DEVICE — 1LYRTR 16FR10ML100%SIL UMS SNP: Brand: MEDLINE INDUSTRIES, INC.